# Patient Record
Sex: FEMALE | Race: WHITE | NOT HISPANIC OR LATINO | Employment: UNEMPLOYED | URBAN - METROPOLITAN AREA
[De-identification: names, ages, dates, MRNs, and addresses within clinical notes are randomized per-mention and may not be internally consistent; named-entity substitution may affect disease eponyms.]

---

## 2018-10-29 PROBLEM — R26.9 GAIT ABNORMALITY: Status: ACTIVE | Noted: 2018-10-29

## 2018-10-29 PROBLEM — R29.898 LEG WEAKNESS: Status: ACTIVE | Noted: 2018-10-29

## 2018-10-29 PROBLEM — Z74.09 IMPAIRED FUNCTIONAL MOBILITY AND ACTIVITY TOLERANCE: Status: ACTIVE | Noted: 2018-10-29

## 2018-10-29 PROBLEM — M25.561 RIGHT KNEE PAIN: Status: ACTIVE | Noted: 2018-10-29

## 2018-11-01 ENCOUNTER — TELEPHONE (OUTPATIENT)
Dept: ELECTROPHYSIOLOGY | Facility: CLINIC | Age: 58
End: 2018-11-01

## 2018-11-01 NOTE — TELEPHONE ENCOUNTER
Attempted to return call. Left message to return call  ----- Message from Maribell Ambrose MA sent at 11/1/2018  9:07 AM CDT -----  Contact: self  Pt. is calling to get Dr. Monique's e-mail address to send [facility] records to [facility] phone and also check outside records from . Please call.

## 2018-11-07 ENCOUNTER — OFFICE VISIT (OUTPATIENT)
Dept: ELECTROPHYSIOLOGY | Facility: CLINIC | Age: 58
End: 2018-11-07
Payer: COMMERCIAL

## 2018-11-07 ENCOUNTER — HOSPITAL ENCOUNTER (OUTPATIENT)
Dept: CARDIOLOGY | Facility: CLINIC | Age: 58
Discharge: HOME | End: 2018-11-07
Payer: COMMERCIAL

## 2018-11-07 VITALS
SYSTOLIC BLOOD PRESSURE: 112 MMHG | WEIGHT: 131.81 LBS | HEIGHT: 64 IN | DIASTOLIC BLOOD PRESSURE: 60 MMHG | BODY MASS INDEX: 22.5 KG/M2 | HEART RATE: 49 BPM

## 2018-11-07 DIAGNOSIS — R00.1 BRADYCARDIA: ICD-10-CM

## 2018-11-07 DIAGNOSIS — E03.9 ACQUIRED HYPOTHYROIDISM: Chronic | ICD-10-CM

## 2018-11-07 DIAGNOSIS — I48.0 PAROXYSMAL ATRIAL FIBRILLATION: Primary | ICD-10-CM

## 2018-11-07 DIAGNOSIS — Z85.3 PERSONAL HISTORY OF BREAST CANCER: ICD-10-CM

## 2018-11-07 PROCEDURE — 99204 OFFICE O/P NEW MOD 45 MIN: CPT | Mod: S$GLB,,, | Performed by: INTERNAL MEDICINE

## 2018-11-07 PROCEDURE — 3008F BODY MASS INDEX DOCD: CPT | Mod: CPTII,S$GLB,, | Performed by: INTERNAL MEDICINE

## 2018-11-07 PROCEDURE — 99999 PR PBB SHADOW E&M-EST. PATIENT-LVL III: CPT | Mod: PBBFAC,,, | Performed by: INTERNAL MEDICINE

## 2018-11-07 NOTE — PROGRESS NOTES
Subjective:    Patient ID:  Nerissa Felix is a 58 y.o. female who presents for evaluation of PAF(paroxysmal atrial fibrillation)      HPI 57 yo female with atrial fibrillation, hypothyroidism, Breast cancer, bradycardia.  Presents for second opinion.  Has been managed by Dr. Galvin.  They are friends with Arsh Cordero.    Has had paroxysmal symptomatic atrial fibrillation, managed with Flecainide as pill in pocket therapy.  First diagnosed in 2015.    Symptoms are palpitations without associated symptoms.  Has been occurring at least 1-2 times a month.  Lasting less than 24 hours.  Flecainide has been effective.  CHADSVASc is 1 (female sex).  Has intermittently been on Xarelto.    Echo 2/17 normal biventricular structure and function.  ECG reveals sinus bradycardia at 49 bpm.    Review of Systems   Constitution: Negative. Negative for weakness and malaise/fatigue.   Cardiovascular: Positive for palpitations. Negative for chest pain, dyspnea on exertion, irregular heartbeat, leg swelling, near-syncope, orthopnea, paroxysmal nocturnal dyspnea and syncope.   Respiratory: Positive for shortness of breath. Negative for cough.    Neurological: Negative for dizziness and light-headedness.   All other systems reviewed and are negative.       Objective:    Physical Exam   Constitutional: She is oriented to person, place, and time. She appears well-developed and well-nourished.   Eyes: Conjunctivae are normal. No scleral icterus.   Neck: No JVD present. No tracheal deviation present.   Cardiovascular: Normal rate and regular rhythm. PMI is not displaced.   Pulmonary/Chest: Effort normal and breath sounds normal. No respiratory distress.   Abdominal: Soft. There is no hepatosplenomegaly. There is no tenderness.   Musculoskeletal: She exhibits no edema or tenderness.   Neurological: She is alert and oriented to person, place, and time.   Skin: Skin is warm and dry. No rash noted.   Psychiatric: She has a normal mood and  affect. Her behavior is normal.         Assessment:       1. Paroxysmal atrial fibrillation    2. Acquired hypothyroidism    3. Personal history of breast cancer         Plan:           Paroxysmal symptomatic atrial fibrillation, with frequent events.  Agree with Dr. Galvin regarding PVI.  Discussed at length.

## 2019-11-14 ENCOUNTER — OFFICE VISIT (OUTPATIENT)
Dept: DERMATOLOGY | Facility: CLINIC | Age: 59
End: 2019-11-14
Payer: COMMERCIAL

## 2019-11-14 VITALS — RESPIRATION RATE: 18 BRPM | WEIGHT: 130.94 LBS | HEIGHT: 64 IN | BODY MASS INDEX: 22.35 KG/M2

## 2019-11-14 DIAGNOSIS — L65.9 ALOPECIA: ICD-10-CM

## 2019-11-14 DIAGNOSIS — L70.0 COMEDONE: ICD-10-CM

## 2019-11-14 DIAGNOSIS — L82.1 SEBORRHEIC KERATOSES: ICD-10-CM

## 2019-11-14 DIAGNOSIS — Z12.83 SCREENING EXAM FOR SKIN CANCER: ICD-10-CM

## 2019-11-14 DIAGNOSIS — L29.9 PRURITUS: ICD-10-CM

## 2019-11-14 DIAGNOSIS — L81.4 LENTIGINES: ICD-10-CM

## 2019-11-14 DIAGNOSIS — D22.9 MULTIPLE BENIGN NEVI: ICD-10-CM

## 2019-11-14 DIAGNOSIS — L82.0 INFLAMED SEBORRHEIC KERATOSIS: Primary | ICD-10-CM

## 2019-11-14 DIAGNOSIS — L57.0 AK (ACTINIC KERATOSIS): ICD-10-CM

## 2019-11-14 PROCEDURE — 99999 PR PBB SHADOW E&M-EST. PATIENT-LVL II: CPT | Mod: PBBFAC,,, | Performed by: DERMATOLOGY

## 2019-11-14 PROCEDURE — 17110 PR DESTRUCTION BENIGN LESIONS UP TO 14: ICD-10-PCS | Mod: S$GLB,,, | Performed by: DERMATOLOGY

## 2019-11-14 PROCEDURE — 17110 DESTRUCTION B9 LES UP TO 14: CPT | Mod: S$GLB,,, | Performed by: DERMATOLOGY

## 2019-11-14 PROCEDURE — 17000 DESTRUCT PREMALG LESION: CPT | Mod: 59,S$GLB,, | Performed by: DERMATOLOGY

## 2019-11-14 PROCEDURE — 99203 OFFICE O/P NEW LOW 30 MIN: CPT | Mod: 25,S$GLB,, | Performed by: DERMATOLOGY

## 2019-11-14 PROCEDURE — 99203 PR OFFICE/OUTPT VISIT, NEW, LEVL III, 30-44 MIN: ICD-10-PCS | Mod: 25,S$GLB,, | Performed by: DERMATOLOGY

## 2019-11-14 PROCEDURE — 3008F PR BODY MASS INDEX (BMI) DOCUMENTED: ICD-10-PCS | Mod: CPTII,S$GLB,, | Performed by: DERMATOLOGY

## 2019-11-14 PROCEDURE — 17000 PR DESTRUCTION(LASER SURGERY,CRYOSURGERY,CHEMOSURGERY),PREMALIGNANT LESIONS,FIRST LESION: ICD-10-PCS | Mod: 59,S$GLB,, | Performed by: DERMATOLOGY

## 2019-11-14 PROCEDURE — 17003 DESTRUCTION, PREMALIGNANT LESIONS; SECOND THROUGH 14 LESIONS: ICD-10-PCS | Mod: 59,S$GLB,, | Performed by: DERMATOLOGY

## 2019-11-14 PROCEDURE — 99999 PR PBB SHADOW E&M-EST. PATIENT-LVL II: ICD-10-PCS | Mod: PBBFAC,,, | Performed by: DERMATOLOGY

## 2019-11-14 PROCEDURE — 3008F BODY MASS INDEX DOCD: CPT | Mod: CPTII,S$GLB,, | Performed by: DERMATOLOGY

## 2019-11-14 PROCEDURE — 17003 DESTRUCT PREMALG LES 2-14: CPT | Mod: 59,S$GLB,, | Performed by: DERMATOLOGY

## 2019-11-14 RX ORDER — TRETINOIN 0.5 MG/G
CREAM TOPICAL NIGHTLY
COMMUNITY
End: 2022-10-03

## 2019-11-14 RX ORDER — TRIAMCINOLONE ACETONIDE 0.25 MG/G
CREAM TOPICAL
Qty: 30 G | Refills: 3 | Status: SHIPPED | OUTPATIENT
Start: 2019-11-14 | End: 2022-04-19

## 2019-11-14 NOTE — PROGRESS NOTES
Subjective:       Patient ID:  Nerissa Felix is a 59 y.o. female who presents for   Chief Complaint   Patient presents with    Lesion     Patient present for initial visit.     C/o lesion to back x year. Pt states lesion has gotten bigger feels scaly. Pt denies color, shape, or spontaneous bleeding. Not treating.    C/o dry spot on R temple 1 year. Not treating.    C/o dry spot to R hand x1 year. Not treating.     C/o large pore on nose. The patient denies any change, including change in color, increase in size, or spontaneous bleeding, associated with this lesion. Pt had treated it by another prescriber, not sure what treatment was called.    C/o lesion under right eye x year. The patient denies any change, including change in color, increase in size, or spontaneous bleeding, associated with this lesion.  Not treating.     C/o hair loss x months. Pt denies any traumas, stress, no surgeries, or change in medication. History of hypothyroidism   Lab Results       Component                Value               Date                       TSH                      <0.015 (L)          05/09/2016                C/o itchy foot for 10 years. Pt saw podiatry, said it was athletes foot. Was prescribed an antifungal, took it for a while but did not finish treatment.    Wants to talk about botox.    no Phx of NMSC.  no Fhx of melanoma.    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 Negative for recent sunburn and wears hat.   Hematologic/Lymphatic: Bruises/bleeds easily.        Objective:    Physical Exam   Constitutional: She appears well-developed and well-nourished. No distress.   Neurological: She is alert and oriented to person, place, and time. She is not disoriented.   Psychiatric: She has a normal mood and affect.   Skin:   Areas Examined (abnormalities noted in diagram):   Scalp / Hair Palpated and Inspected  Head / Face Inspection Performed  Neck Inspection Performed  Chest / Axilla Inspection Performed  Abdomen Inspection Performed  Genitals / Buttocks / Groin Inspection Performed  Back Inspection Performed  RUE Inspected  LUE Inspection Performed  RLE Inspected  LLE Inspection Performed  Nails and Digits Inspection Performed                               Diagram Legend     Erythematous scaling macule/papule c/w actinic keratosis       Vascular papule c/w angioma      Pigmented verrucoid papule/plaque c/w seborrheic keratosis      Yellow umbilicated papule c/w sebaceous hyperplasia      Irregularly shaped tan macule c/w lentigo     1-2 mm smooth white papules consistent with Milia      Movable subcutaneous cyst with punctum c/w epidermal inclusion cyst      Subcutaneous movable cyst c/w pilar cyst      Firm pink to brown papule c/w dermatofibroma      Pedunculated fleshy papule(s) c/w skin tag(s)      Evenly pigmented macule c/w junctional nevus     Mildly variegated pigmented, slightly irregular-bordered macule c/w mildly atypical nevus      Flesh colored to evenly pigmented papule c/w intradermal nevus       Pink pearly papule/plaque c/w basal cell carcinoma      Erythematous hyperkeratotic cursted plaque c/w SCC      Surgical scar with no sign of skin cancer recurrence      Open and closed comedones      Inflammatory papules and pustules      Verrucoid papule consistent consistent with wart     Erythematous eczematous patches and plaques      Dystrophic onycholytic nail with subungual debris c/w onychomycosis     Umbilicated papule    Erythematous-base heme-crusted tan verrucoid plaque consistent with inflamed seborrheic keratosis     Erythematous Silvery Scaling Plaque c/w Psoriasis     See annotation      Assessment / Plan:        Inflamed seborrheic keratosis  Procedure note for destruction via hyfrecation and curettage:    Verbal consent obtained. Risks of recurrence and scarring discussed.   1 lesions cleaned with alcohol and anesthetized with 1% lidocaine with epinephrine. Areas then lightly hyfrecated and curetted to remove gross lesion. Hemostasis achieved with aluminum chloride application. No complications.   Areas dressed with Vaseline jelly and bandage. Wound care discussed.    AK (actinic keratosis)  Premalignant nature discussed     Cryosurgery Procedure Note    Verbal consent from the patient is obtained including, but not limited to, risk of hypopigmentation/hyperpigmentation, scar, recurrence of lesion. The patient is aware of the precancerous quality and need for treatment of these lesions. Liquid nitrogen cryosurgery is applied to the 2 actinic keratoses, as detailed in the physical exam, to produce a freeze injury. The patient is aware that blisters may form and is instructed on wound care with gentle cleansing and use of vaseline ointment to keep moist until healed. The patient is supplied a handout on cryosurgery and is instructed to call if lesions do not completely resolve.      Seborrheic keratoses  These are benign inherited growths without a malignant potential. Reassurance given to patient. No treatment is necessary.       Multiple benign nevi  total body skin examination performed today including at least 12 points as noted in physical examination. No lesions suspicious for malignancy noted.  Reassurance provided.  Instructed patient to observe lesion(s) for changes and follow up in clinic if changes are noted. Discussed  BEE's of moles and brochure provided.      Lentigines  This is a benign hyperpigmented sun induced lesion. Daily sun protection will reduce the number of new lesions. Treatment of these benign lesions are considered cosmetic.  - can continue Hydroquinone and kojic acid cream x 3 months then one month break    Pruritus on right medial foot - could be nerve related  -     triamcinolone acetonide 0.025% (KENALOG) 0.025 % cream; AAA bid for pruritus  Dispense: 30 g; Refill: 3    Comedone  - Continue tretinoin 0.05% cream. Tolerating well with mild flaking    Alopecia  - History of hypothyroidism   - She is seeing her PCP tomorrow to check her thyroid. Also recommend CBC, vit D and iron studies to evaluate for systemic causes  -Recommend that the patient consider a trial of at least 6 months of minoxidil 5% topical solution/foam BID. We discussed that she may initially notice hair loss with initiation of this medication. We also recommended that she taper the medication should she choose to discontinue it.  -We recommended dietary supplementation with Biotin 5000 mcg per day and Vitamin D 800-1200 international units per day.    Screening exam for skin cancer  Total body skin examination performed today including at least 12 points as noted in physical examination. No lesions suspicious for malignancy noted.               Follow up in about 1 year (around 11/14/2020) for skin check.

## 2020-12-02 ENCOUNTER — OFFICE VISIT (OUTPATIENT)
Dept: DERMATOLOGY | Facility: CLINIC | Age: 60
End: 2020-12-02
Payer: COMMERCIAL

## 2020-12-02 VITALS — HEIGHT: 64 IN | WEIGHT: 130 LBS | BODY MASS INDEX: 22.2 KG/M2

## 2020-12-02 DIAGNOSIS — D22.9 MULTIPLE BENIGN NEVI: ICD-10-CM

## 2020-12-02 DIAGNOSIS — L82.0 INFLAMED SEBORRHEIC KERATOSIS: ICD-10-CM

## 2020-12-02 DIAGNOSIS — L70.0 OPEN COMEDONE: Primary | ICD-10-CM

## 2020-12-02 DIAGNOSIS — Z12.83 SCREENING EXAM FOR SKIN CANCER: ICD-10-CM

## 2020-12-02 DIAGNOSIS — L81.4 LENTIGINES: ICD-10-CM

## 2020-12-02 DIAGNOSIS — L82.1 SEBORRHEIC KERATOSES: ICD-10-CM

## 2020-12-02 PROCEDURE — 1126F PR PAIN SEVERITY QUANTIFIED, NO PAIN PRESENT: ICD-10-PCS | Mod: S$GLB,,, | Performed by: DERMATOLOGY

## 2020-12-02 PROCEDURE — 99214 PR OFFICE/OUTPT VISIT, EST, LEVL IV, 30-39 MIN: ICD-10-PCS | Mod: 25,S$GLB,, | Performed by: DERMATOLOGY

## 2020-12-02 PROCEDURE — 3008F BODY MASS INDEX DOCD: CPT | Mod: CPTII,S$GLB,, | Performed by: DERMATOLOGY

## 2020-12-02 PROCEDURE — 99999 PR PBB SHADOW E&M-EST. PATIENT-LVL III: CPT | Mod: PBBFAC,,, | Performed by: DERMATOLOGY

## 2020-12-02 PROCEDURE — 1126F AMNT PAIN NOTED NONE PRSNT: CPT | Mod: S$GLB,,, | Performed by: DERMATOLOGY

## 2020-12-02 PROCEDURE — 17110 PR DESTRUCTION BENIGN LESIONS UP TO 14: ICD-10-PCS | Mod: S$GLB,,, | Performed by: DERMATOLOGY

## 2020-12-02 PROCEDURE — 99999 PR PBB SHADOW E&M-EST. PATIENT-LVL III: ICD-10-PCS | Mod: PBBFAC,,, | Performed by: DERMATOLOGY

## 2020-12-02 PROCEDURE — 17110 DESTRUCTION B9 LES UP TO 14: CPT | Mod: S$GLB,,, | Performed by: DERMATOLOGY

## 2020-12-02 PROCEDURE — 3008F PR BODY MASS INDEX (BMI) DOCUMENTED: ICD-10-PCS | Mod: CPTII,S$GLB,, | Performed by: DERMATOLOGY

## 2020-12-02 PROCEDURE — 99214 OFFICE O/P EST MOD 30 MIN: CPT | Mod: 25,S$GLB,, | Performed by: DERMATOLOGY

## 2020-12-02 NOTE — PROGRESS NOTES
Subjective:       Patient ID:  Nerissa Felix is a 60 y.o. female who presents for   Chief Complaint   Patient presents with    Follow-up     LOV: 11/14/2019 with Dr Wells    60 yr old F present skin check. She has a bump on back of neck that was there at last visit which was removed via hyfrecation and curettage    no Phx of NMSC.  + personal history of breast cancer  no Fhx of melanoma.    Past Medical History:  No date: Anticoagulant long-term use      Comment:  81 mg asa  2007: Cancer      Comment:  Left breast  DCIS  No date: Chronic left shoulder pain  No date: H/O pleural effusion  [id number]: Hashimoto encephalopathy      Comment:  autoimmune disorder in remission  No date: Heart murmur  No date: Iliotibial band syndrome of right side  No date: Neck pain  No date: PAF (paroxysmal atrial fibrillation)  No date: Seizures      Comment:  1999  No date: Thyroid disease      Comment:  hypo  No date: TMJ (dislocation of temporomandibular joint)      Review of Systems   Skin: Positive for daily sunscreen use. Negative for recent sunburn and wears hat.   Hematologic/Lymphatic: Bruises/bleeds easily.        Objective:    Physical Exam   Constitutional: She appears well-developed and well-nourished. No distress.   Neurological: She is alert and oriented to person, place, and time. She is not disoriented.   Psychiatric: She has a normal mood and affect.   Skin:   Areas Examined (abnormalities noted in diagram):   Scalp / Hair Palpated and Inspected  Head / Face Inspection Performed  Neck Inspection Performed  Chest / Axilla Inspection Performed  Abdomen Inspection Performed  Genitals / Buttocks / Groin Inspection Performed  Back Inspection Performed  RUE Inspected  LUE Inspection Performed  RLE Inspected  LLE Inspection Performed  Nails and Digits Inspection Performed                           Diagram Legend     Erythematous scaling macule/papule c/w actinic keratosis       Vascular papule c/w angioma      Pigmented  verrucoid papule/plaque c/w seborrheic keratosis      Yellow umbilicated papule c/w sebaceous hyperplasia      Irregularly shaped tan macule c/w lentigo     1-2 mm smooth white papules consistent with Milia      Movable subcutaneous cyst with punctum c/w epidermal inclusion cyst      Subcutaneous movable cyst c/w pilar cyst      Firm pink to brown papule c/w dermatofibroma      Pedunculated fleshy papule(s) c/w skin tag(s)      Evenly pigmented macule c/w junctional nevus     Mildly variegated pigmented, slightly irregular-bordered macule c/w mildly atypical nevus      Flesh colored to evenly pigmented papule c/w intradermal nevus       Pink pearly papule/plaque c/w basal cell carcinoma      Erythematous hyperkeratotic cursted plaque c/w SCC      Surgical scar with no sign of skin cancer recurrence      Open and closed comedones      Inflammatory papules and pustules      Verrucoid papule consistent consistent with wart     Erythematous eczematous patches and plaques     Dystrophic onycholytic nail with subungual debris c/w onychomycosis     Umbilicated papule    Erythematous-base heme-crusted tan verrucoid plaque consistent with inflamed seborrheic keratosis     Erythematous Silvery Scaling Plaque c/w Psoriasis     See annotation      Assessment / Plan:        Open comedone  - After verbal consent was obtained, Area prepped with alcohol, anesthetized with approximately 1.0cc of 1% lidocaine with epinephrine.  A sterile 11 blade needle was used to puncture the surface. Comedo extractor was used to expel contents. Patient tolerated procedure without complications. Site(s) were dressed with vaseline and bandage(s) applied as necessary. Wound care instructions were provided.      Inflamed seborrheic keratosis  Procedure note for destruction via hyfrecation and curettage:     Verbal consent obtained. Risks of recurrence and scarring discussed.   1 lesions cleaned with alcohol and anesthetized with 1% lidocaine with  epinephrine. Areas then lightly hyfrecated and curetted to remove gross lesion. Hemostasis achieved with aluminum chloride application. No complications.   Areas dressed with Vaseline jelly and bandage. Wound care discussed.       Multiple benign nevi  Total body skin examination performed today including at least 12 points as noted in physical examination. No lesions suspicious for malignancy noted.      Seborrheic keratoses  These are benign inherited growths without a malignant potential. Reassurance given to patient. No treatment is necessary.       Lentigines  This is a benign hyperpigmented sun induced lesion. Daily sun protection will reduce the number of new lesions. Treatment of these benign lesions are considered cosmetic.      Screening exam for skin cancer  Total body skin examination performed today including at least 12 points as noted in physical examination. No lesions suspicious for malignancy noted.               Follow up in about 1 year (around 12/2/2021).

## 2021-04-13 ENCOUNTER — TELEPHONE (OUTPATIENT)
Dept: DERMATOLOGY | Facility: CLINIC | Age: 61
End: 2021-04-13

## 2022-01-18 ENCOUNTER — TELEPHONE (OUTPATIENT)
Dept: PLASTIC SURGERY | Facility: CLINIC | Age: 62
End: 2022-01-18
Payer: COMMERCIAL

## 2022-01-18 NOTE — TELEPHONE ENCOUNTER
Attempted to contact pt discuss upcoming appointment. Pt was not available at the time of the call.  Provider will be out due to a recent positive COVID test.  I left a detailed VM asking pt to call PLS office at her convenience to re schedule.

## 2022-03-11 ENCOUNTER — OFFICE VISIT (OUTPATIENT)
Dept: PLASTIC SURGERY | Facility: CLINIC | Age: 62
End: 2022-03-11
Payer: COMMERCIAL

## 2022-03-11 VITALS — SYSTOLIC BLOOD PRESSURE: 106 MMHG | HEART RATE: 69 BPM | DIASTOLIC BLOOD PRESSURE: 66 MMHG

## 2022-03-11 DIAGNOSIS — R22.0 MASS OF FACE: Primary | ICD-10-CM

## 2022-03-11 PROCEDURE — 99203 OFFICE O/P NEW LOW 30 MIN: CPT | Mod: S$GLB,,, | Performed by: SURGERY

## 2022-03-11 PROCEDURE — 1159F PR MEDICATION LIST DOCUMENTED IN MEDICAL RECORD: ICD-10-PCS | Mod: CPTII,S$GLB,, | Performed by: SURGERY

## 2022-03-11 PROCEDURE — 99999 PR PBB SHADOW E&M-EST. PATIENT-LVL III: ICD-10-PCS | Mod: PBBFAC,,, | Performed by: SURGERY

## 2022-03-11 PROCEDURE — 99203 PR OFFICE/OUTPT VISIT, NEW, LEVL III, 30-44 MIN: ICD-10-PCS | Mod: S$GLB,,, | Performed by: SURGERY

## 2022-03-11 PROCEDURE — 1160F RVW MEDS BY RX/DR IN RCRD: CPT | Mod: CPTII,S$GLB,, | Performed by: SURGERY

## 2022-03-11 PROCEDURE — 3074F SYST BP LT 130 MM HG: CPT | Mod: CPTII,S$GLB,, | Performed by: SURGERY

## 2022-03-11 PROCEDURE — 3078F PR MOST RECENT DIASTOLIC BLOOD PRESSURE < 80 MM HG: ICD-10-PCS | Mod: CPTII,S$GLB,, | Performed by: SURGERY

## 2022-03-11 PROCEDURE — 1159F MED LIST DOCD IN RCRD: CPT | Mod: CPTII,S$GLB,, | Performed by: SURGERY

## 2022-03-11 PROCEDURE — 3078F DIAST BP <80 MM HG: CPT | Mod: CPTII,S$GLB,, | Performed by: SURGERY

## 2022-03-11 PROCEDURE — 3074F PR MOST RECENT SYSTOLIC BLOOD PRESSURE < 130 MM HG: ICD-10-PCS | Mod: CPTII,S$GLB,, | Performed by: SURGERY

## 2022-03-11 PROCEDURE — 1160F PR REVIEW ALL MEDS BY PRESCRIBER/CLIN PHARMACIST DOCUMENTED: ICD-10-PCS | Mod: CPTII,S$GLB,, | Performed by: SURGERY

## 2022-03-11 PROCEDURE — 99999 PR PBB SHADOW E&M-EST. PATIENT-LVL III: CPT | Mod: PBBFAC,,, | Performed by: SURGERY

## 2022-03-11 NOTE — PROGRESS NOTES
Patient presents [facility] with a small cyst on the left lower cheek about 1 cm above the mandibular angle.  We will excise this in the minor surgery room in [place].

## 2022-03-15 ENCOUNTER — TELEPHONE (OUTPATIENT)
Dept: UROLOGY | Facility: CLINIC | Age: 62
End: 2022-03-15
Payer: COMMERCIAL

## 2022-03-15 NOTE — TELEPHONE ENCOUNTER
Spoke with Dotty at Dr Camargo office, will fax patient records from Dr Gillis when patient was seen by him @ [facility].

## 2022-03-15 NOTE — TELEPHONE ENCOUNTER
----- Message from Padmaja Gonzalez sent at 3/15/2022  4:30 PM CDT -----  Regarding: advice  Contact: stan  Type: Needs Medical Advice  Who Called:  stan with dr linda office  Symptoms (please be specific):  n/a  How long has patient had these symptoms:  n/a  Pharmacy name and phone #:  n/a  Best Call Back Number: [phone]    Additional Information: regarding wanda everett's provider saw

## 2022-03-18 ENCOUNTER — TELEPHONE (OUTPATIENT)
Dept: PLASTIC SURGERY | Facility: CLINIC | Age: 62
End: 2022-03-18
Payer: COMMERCIAL

## 2022-06-01 ENCOUNTER — APPOINTMENT (OUTPATIENT)
Dept: PLASTIC SURGERY | Facility: CLINIC | Age: 62
End: 2022-06-01
Payer: COMMERCIAL

## 2022-06-01 DIAGNOSIS — R22.0 MASS OF FACE: Primary | ICD-10-CM

## 2022-06-01 PROCEDURE — 88307 TISSUE EXAM BY PATHOLOGIST: CPT | Performed by: STUDENT IN AN ORGANIZED HEALTH CARE EDUCATION/TRAINING PROGRAM

## 2022-06-01 PROCEDURE — 88307 PR  SURG PATH,LEVEL V: ICD-10-PCS | Mod: 26,,, | Performed by: STUDENT IN AN ORGANIZED HEALTH CARE EDUCATION/TRAINING PROGRAM

## 2022-06-01 PROCEDURE — 88307 TISSUE EXAM BY PATHOLOGIST: CPT | Mod: 26,,, | Performed by: STUDENT IN AN ORGANIZED HEALTH CARE EDUCATION/TRAINING PROGRAM

## 2022-06-07 LAB
FINAL PATHOLOGIC DIAGNOSIS: NORMAL
Lab: NORMAL

## 2022-06-17 ENCOUNTER — OFFICE VISIT (OUTPATIENT)
Dept: PLASTIC SURGERY | Facility: CLINIC | Age: 62
End: 2022-06-17
Payer: COMMERCIAL

## 2022-06-17 VITALS
DIASTOLIC BLOOD PRESSURE: 74 MMHG | HEART RATE: 76 BPM | HEIGHT: 64 IN | BODY MASS INDEX: 22.36 KG/M2 | WEIGHT: 131 LBS | SYSTOLIC BLOOD PRESSURE: 122 MMHG

## 2022-06-17 DIAGNOSIS — Z09 SURGERY FOLLOW-UP EXAMINATION: Primary | ICD-10-CM

## 2022-06-17 PROCEDURE — 99024 PR POST-OP FOLLOW-UP VISIT: ICD-10-PCS | Mod: S$GLB,,, | Performed by: SURGERY

## 2022-06-17 PROCEDURE — 3074F PR MOST RECENT SYSTOLIC BLOOD PRESSURE < 130 MM HG: ICD-10-PCS | Mod: CPTII,S$GLB,, | Performed by: SURGERY

## 2022-06-17 PROCEDURE — 1160F RVW MEDS BY RX/DR IN RCRD: CPT | Mod: CPTII,S$GLB,, | Performed by: SURGERY

## 2022-06-17 PROCEDURE — 99999 PR PBB SHADOW E&M-EST. PATIENT-LVL III: CPT | Mod: PBBFAC,,, | Performed by: SURGERY

## 2022-06-17 PROCEDURE — 99999 PR PBB SHADOW E&M-EST. PATIENT-LVL III: ICD-10-PCS | Mod: PBBFAC,,, | Performed by: SURGERY

## 2022-06-17 PROCEDURE — 3008F PR BODY MASS INDEX (BMI) DOCUMENTED: ICD-10-PCS | Mod: CPTII,S$GLB,, | Performed by: SURGERY

## 2022-06-17 PROCEDURE — 3008F BODY MASS INDEX DOCD: CPT | Mod: CPTII,S$GLB,, | Performed by: SURGERY

## 2022-06-17 PROCEDURE — 3078F DIAST BP <80 MM HG: CPT | Mod: CPTII,S$GLB,, | Performed by: SURGERY

## 2022-06-17 PROCEDURE — 1159F PR MEDICATION LIST DOCUMENTED IN MEDICAL RECORD: ICD-10-PCS | Mod: CPTII,S$GLB,, | Performed by: SURGERY

## 2022-06-17 PROCEDURE — 99024 POSTOP FOLLOW-UP VISIT: CPT | Mod: S$GLB,,, | Performed by: SURGERY

## 2022-06-17 PROCEDURE — 3078F PR MOST RECENT DIASTOLIC BLOOD PRESSURE < 80 MM HG: ICD-10-PCS | Mod: CPTII,S$GLB,, | Performed by: SURGERY

## 2022-06-17 PROCEDURE — 1160F PR REVIEW ALL MEDS BY PRESCRIBER/CLIN PHARMACIST DOCUMENTED: ICD-10-PCS | Mod: CPTII,S$GLB,, | Performed by: SURGERY

## 2022-06-17 PROCEDURE — 3074F SYST BP LT 130 MM HG: CPT | Mod: CPTII,S$GLB,, | Performed by: SURGERY

## 2022-06-17 PROCEDURE — 1159F MED LIST DOCD IN RCRD: CPT | Mod: CPTII,S$GLB,, | Performed by: SURGERY

## 2022-06-17 NOTE — PROGRESS NOTES
Patient is status post excision of a cyst of the left lateral cheek.  She is doing well.  We did not use any superficial sutures only skin glue.  Everything is healed nicely she is discharged.

## 2022-07-21 ENCOUNTER — TELEPHONE (OUTPATIENT)
Dept: HEMATOLOGY/ONCOLOGY | Facility: CLINIC | Age: 62
End: 2022-07-21
Payer: COMMERCIAL

## 2022-07-21 NOTE — TELEPHONE ENCOUNTER
"Returned call to schedule, she did not answer. Left a brief message with my direct call back number.    ----- Message from Morgan Miller sent at 7/20/2022  2:17 PM CDT -----  Scheduling Request        Patient Status: Np      Scheduling Appt: Genetics      Time/Date Preference: Soonest available      Contact Preference?: [phone]       Treating Provider: Anushka      Do you feel you need to be seen today? No      Additional Notes: Stating she was referred by Dr. Reveles through an email sent to her daughter (MAYELA MORFIN [[id number]])      "Thank you for all that you do for our patients"       "

## 2022-08-12 ENCOUNTER — PATIENT MESSAGE (OUTPATIENT)
Dept: HEMATOLOGY/ONCOLOGY | Facility: CLINIC | Age: 62
End: 2022-08-12
Payer: COMMERCIAL

## 2022-09-23 ENCOUNTER — PATIENT MESSAGE (OUTPATIENT)
Dept: HEMATOLOGY/ONCOLOGY | Facility: CLINIC | Age: 62
End: 2022-09-23
Payer: COMMERCIAL

## 2022-09-29 ENCOUNTER — OFFICE VISIT (OUTPATIENT)
Dept: HEMATOLOGY/ONCOLOGY | Facility: CLINIC | Age: 62
End: 2022-09-29
Payer: COMMERCIAL

## 2022-09-29 ENCOUNTER — LAB VISIT (OUTPATIENT)
Dept: LAB | Facility: HOSPITAL | Age: 62
End: 2022-09-29
Payer: COMMERCIAL

## 2022-09-29 DIAGNOSIS — Z85.3 PERSONAL HISTORY OF BREAST CANCER: ICD-10-CM

## 2022-09-29 DIAGNOSIS — Z85.3 PERSONAL HISTORY OF BREAST CANCER: Primary | ICD-10-CM

## 2022-09-29 DIAGNOSIS — Z80.0 FAMILY HISTORY OF PANCREATIC CANCER: ICD-10-CM

## 2022-09-29 DIAGNOSIS — Z80.3 FAMILY HISTORY OF BREAST CANCER: ICD-10-CM

## 2022-09-29 DIAGNOSIS — Z71.83 ENCOUNTER FOR NONPROCREATIVE GENETIC COUNSELING: ICD-10-CM

## 2022-09-29 PROCEDURE — 1160F PR REVIEW ALL MEDS BY PRESCRIBER/CLIN PHARMACIST DOCUMENTED: ICD-10-PCS | Mod: CPTII,S$GLB,, | Performed by: PHYSICIAN ASSISTANT

## 2022-09-29 PROCEDURE — 1159F PR MEDICATION LIST DOCUMENTED IN MEDICAL RECORD: ICD-10-PCS | Mod: CPTII,S$GLB,, | Performed by: PHYSICIAN ASSISTANT

## 2022-09-29 PROCEDURE — 36415 COLL VENOUS BLD VENIPUNCTURE: CPT | Performed by: PHYSICIAN ASSISTANT

## 2022-09-29 PROCEDURE — 99205 PR OFFICE/OUTPT VISIT, NEW, LEVL V, 60-74 MIN: ICD-10-PCS | Mod: S$GLB,,, | Performed by: PHYSICIAN ASSISTANT

## 2022-09-29 PROCEDURE — 1160F RVW MEDS BY RX/DR IN RCRD: CPT | Mod: CPTII,S$GLB,, | Performed by: PHYSICIAN ASSISTANT

## 2022-09-29 PROCEDURE — 1159F MED LIST DOCD IN RCRD: CPT | Mod: CPTII,S$GLB,, | Performed by: PHYSICIAN ASSISTANT

## 2022-09-29 PROCEDURE — 99999 PR PBB SHADOW E&M-EST. PATIENT-LVL III: CPT | Mod: PBBFAC,,, | Performed by: PHYSICIAN ASSISTANT

## 2022-09-29 PROCEDURE — 99205 OFFICE O/P NEW HI 60 MIN: CPT | Mod: S$GLB,,, | Performed by: PHYSICIAN ASSISTANT

## 2022-09-29 PROCEDURE — 99999 PR PBB SHADOW E&M-EST. PATIENT-LVL III: ICD-10-PCS | Mod: PBBFAC,,, | Performed by: PHYSICIAN ASSISTANT

## 2022-09-29 NOTE — PROGRESS NOTES
"Department of Hematology and Oncology  [facility]    Cancer Genetics  Initial Consult    Date of Service:  22  Visit Provider:  Joanna Ferrera PA-C  Collaborating Physician:  Ophelia Fontenot MD    Patient:  Nerissa Felix  :  1960  MRN:  [id number]     Referring Provider    Self referral    SUBJECTIVE      Chief Complaint: Genetic evaluation  History of Present Illness (HPI):  Nerissa Felix ("Nerissa"), 62 y.o., assigned female sex at birth, is new to the [facility] Department of [facility]ology and On[facility] and to me. She has a history of breast DCIS diagnosed in  at age 46. She underwent genetic testing in  that was negative for mutations in BRCA1/2. I saw Nerissa's daughter, Rosario, for genetic counseling in 2022 and explained that Nerissa is the best candidate for testing as she had breast cancer and we recommend updated testing for anyone who had testing prior to [id number]. Advances have been made in genetic testing since  and we now test for 13 breast cancer genes and are able to detect BRCA1/2 mutations that were previously undetectable. Rosario and Nerissa agreed with the plan, so Nerissa presents today for genetic risk assessment and updated testing.     Genetic Assessment History  Germline cancer-genetic testing: Yes - Negative BRCA1/2 testing in .  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   Benign tumors: No  Colon polyps: No - Normal colonscopy in , 2016.  Pancreatitis: No  Chemoprevention: Never used  Uterus and ovaries intact: Yes  Ashkenazi Confucianism ancestry: No    Past Medical History:   Diagnosis Date    Chronic left shoulder pain     Ductal carcinoma in situ (DCIS) of left breast     H/O pleural effusion     Hashimoto encephalopathy 1999    autoimmune disorder in remission    Heart murmur     Hypothyroidism     Iliotibial " band syndrome of right side     Long-term use of aspirin therapy     81 mg asa    Neck pain     PAF (paroxysmal atrial fibrillation)     Seizures 1999    TMJ (dislocation of temporomandibular joint)      Patient Active Problem List    Diagnosis Date Noted    Other specified cardiac arrhythmias     Bradycardia 11/07/2018    Right knee pain 10/29/2018    Leg weakness 10/29/2018    Gait abnormality 10/29/2018    Impaired functional mobility and activity tolerance 10/29/2018    Paroxysmal atrial fibrillation 05/11/2016    Acute right lower quadrant pain 05/08/2016    Acute colitis 05/08/2016    Acquired hypothyroidism 05/08/2016    Lesion of soft tissue 12/28/2015    Personal history of breast cancer 2007      Family History  Germline cancer-genetic testing in blood relatives:  No  Consanguinity in ancestors:  No  No known history of cancer of colorectal polyps in extended family other than noted below.       Family History   Problem Relation Age of Onset    Breast cancer Mother 73    Arthritis Mother     Atrial fibrillation Father     Breast cancer Maternal Grandmother 70    Stroke Maternal Grandmother     Lung cancer Paternal Grandfather         +smoker    Pancreatic cancer Other     Breast cancer Other     Hypertension Neg Hx     Diabetes Neg Hx       Review of Systems  See HPI.    Pain 0/10  Recent falls: None   Patient's Distress Score today was 4/10 (with 10 being the worst). Anxiety regarding the visit. Referral to SW/psychology is not indicated.      OBJECTIVE      Physical Exam  Very pleasant patient.  Unaccompanied  Vitals signs:  There were no vitals filed for this visit.   Constitutional      Appearance:  Well developed and well nourished. No apparent distress.   Pulmonary     Effort:  Normal  Neurological     Mental Status:  Alert and oriented.     Coordination:  Normal  Psychiatric        Mood and Affect: Normal     Thought Content:  Normal       Speech:  Normal     Behavior:  Normal     Judgment:   Normal  Genetics-specific     It is my assessment that the patient is ready to proceed with cancer-genetic testing from a psychosocial perspective.    COUNSELING      Germline cancer-genetic testing is the testing of genes associated with cancer, known as cancer susceptibility genes.  Just as these genes are inherited from parents, mutations in these genes can be inherited, as well.  A mutation in a cancer susceptibility gene adversely affects the gene's ability to prevent cancer; therefore, carriers of cancer susceptibility gene mutations may be at increased risk for certain cancers.    A small percentage of cancers are caused by an cancer susceptibility gene mutation, meaning the cancer is genetic/hereditary; rather, most cancers are sporadic.  Causes of sporadic cancers may include environmental risk factors, lifestyle risk factors, and non-modifiable risk factors.  It is important to note that members of a family often share not only their genetics but also risk factors including environmental and lifestyle risk factors.    Results of genetic testing include positive, negative, and variant of unknown significance (VUS).  A positive result indicates the presence of at least one clinically significant mutation, and the patient's specific cancer risks vary depending upon the tumor-suppressor gene(s) in which there is/are a mutation(s).  With a positive result, in some cases, depending upon the specific result and the patient's clinical history, modified management may be recommended, including measures for risk reduction and/or surveillance; however, modified management is not always an option.  A negative result indicates that no clinically significant mutations were identified in the gene(s) tested.  A VUS indicates that there is not presently enough data for the laboratory to make a determination as to whether the variant is clinically significant; VUSs are not typically acted upon clinically.      The ability  to interpret the meaning of a negative genetic testing result in genes associated with cancer with which the patient has not personally been affected, when done prior to testing the appropriate affected relative(s), is significantly limited.  A negative result in the patient does not indicate that she cannot develop cancer, and, in fact, the patient may even be at increased risk for cancer based on shared risk factors with affected relatives.  The most informative candidates for initial genetic testing in a family are those who have been affected with cancer.    Modified management may also be recommended, even with a result of no or unknown significance, based upon risk assessment that incorporates the family history.      Sometimes, depending upon the genetic testing result and the cancer diagnosis, additional/modified treatments may be an option, though this is not guaranteed.    If Nerissa tests positive for a mutation, her first-degree relatives would each have a 50% chance of having the same mutation, and other, more distantly related blood-relatives would also be at risk of having the same mutation.       The Genetic Information Nondiscrimination Act (MARLO) is U.S. federal legislation that provides some protections against use of an individual's genetic information by their health insurer and by their employer.  Title I of MARLO prohibits most health insurers from utilizing an individual's genetic information to make decisions regarding insurance eligibility, coverage, underwriting, or premium charges.  Title II of MARLO prohibits covered entities, which include employers, employment agencies, labor organizations, and joint labor-management training and apprenticeship programs, from requesting, requiring, or disclosing the genetic information of employees and applicants.  MARLO also prohibits health insurers and employers from asking or mandating that an individual take a genetic test.  MARLO does not protect  individuals from genetic discrimination toward health insurance obtained through a job with the  or through the Federal Employees Health Benefits Plan; from genetic discrimination by employers with fewer than 15 employees; or from genetic discrimination by any other type of policies/entities, including but not limited to life insurance, disability insurance, long-term care insurance,  benefits, and  Health Services benefits.     An outside laboratory would perform the testing after a blood sample is collected here at the [facility] or a saliva sample is collected by the patient at home.  With genetic testing, there is a potential for the patient to incur out-of-pocket costs.  Results can take several weeks.  Post-test genetic counseling can be conducted once the genetic testing results are available.     Questions were encouraged and answered to the patient's satisfaction, and she verbalized understanding of information and agreement with the plan.       ASSESSMENT/PLAN      A cancer-genetic evaluation and pre-test genetic counseling were conducted. Neirssa has a personal history of unilateral DCIS at age 46 and family history of late-onset breast cancer in her mother and maternal grandmother. Based on the information provided by Nerissa, her personal and/or family history is mildly suggestive of a potential hereditary predisposition to cancer. The recommendation is to proceed with germline testing to include the genes commonly associated with hereditary breast cancer (SAE, BARD1, BRCA1, BRCA2, CDH1, CHEK2, NF1, PALB2, PTEN, RAD51C, RAD51D, STK11, TP53). Nerissa was given the option of proceeding with testing now, deferring testing to a later date, or declining testing and opted to proceed. She was also given the option of limited versus broad-panel testing and opted for limited testing to include only those genes associated with hereditary breast cancer.     Genetic test: [facility] Breast  Cancer Panel, 13+ genes (60840)  Specimen type: blood   Collection: By [facility] today.    Consent: The patient has provided her written informed consent.    Results are expected approximately 2-3 weeks after the genetic testing laboratory receives the specimen.      1. Encounter for nonprocreative genetic counseling    2. Personal history of breast cancer  - Genetic Misc Sendout Test, Blood; Future  - Proceed with germline genetic testing.  - Follow up with results.    3. Family history of breast cancer  - Genetic Misc Sendout Test, Blood; Future    Approximately 45 minutes were spent face-to-face with the patient.  Approximately 75 minutes in total were spent on this encounter, which includes face-to-face time and non-face-to-face time preparing to see the patient (e.g., review of tests), obtaining and/or reviewing separately obtained history, documenting clinical information in the electronic or other health record, independently interpreting results (not separately reported) and communicating results to the patient/family/caregiver, or care coordination (not separately reported).     REFERENCES     National Comprehensive Cancer Network (NCCN). (2021). Genetic/familial high-risk assessment: Breast, ovarian, and pancreatic. NCCN Clinical Practice Guidelines in Oncology (NCCN Guidelines), Version 1.2022.    Joanna Ferrera PA-C, MPAS, PA-C  Physician Assistant, Hereditary/High Risk Clinic  Hematology/Oncology, [facility]

## 2022-10-12 LAB
GENETIC COUNSELING?: YES
GENSO SPECIMEN TYPE: NORMAL
MISCELLANEOUS GENETIC TEST NAME: NORMAL
PARTENTAL OR SIBLING TESTING?: NO
REFERENCE LAB: NORMAL
TEST RESULT: NORMAL

## 2022-10-13 ENCOUNTER — PATIENT MESSAGE (OUTPATIENT)
Dept: HEMATOLOGY/ONCOLOGY | Facility: CLINIC | Age: 62
End: 2022-10-13
Payer: COMMERCIAL

## 2022-10-13 ENCOUNTER — DOCUMENTATION ONLY (OUTPATIENT)
Dept: HEMATOLOGY/ONCOLOGY | Facility: CLINIC | Age: 62
End: 2022-10-13
Payer: COMMERCIAL

## 2022-10-13 NOTE — LETTER
October 13, 2022    Nerissa Felix  [address] [place] [address]     Dear Nerissa,    Below are the results from your recent cancer genetic testing. Please review and let us know if you have any questions or concerns. If you would like to schedule an in-person or a virtual appointment with me to further discuss the results, please message me through your [facility] patient portal or call [phone] to request a post-test genetic counseling appointment.    A copy of the [email or web address] results report is available to you in your [facility] medical record and the [email or web address] patient portal. If you have any difficulty accessing your report, please let our office know so we can mail you a hard copy.    Germline Genetic Testing  The [facility] Breast Cancer Panel analyzes 13 genes (SAE, BARD1, BRCA1, BRCA2, CDH1, CHEK2, NF1, PALB2, PTEN, RAD51C, RAD51D, STK11, TP53) associated with hereditary breast cancer.     RESULTS:   Negative  The test did not identify any genetic variants known to cause cancer.         What do these results mean?    You tested negative for mutations in the genes currently associated with hereditary breast cancer.    Testing was comprehensive for the genes associated with these cancers so no additional testing is indicated.   This negative result significantly reduces, but does not completely eliminate, the possibility that you have a hereditary breast cancer. No further testing is indicated at this time, but may be indicated in the future if new genes that were not included on this test become associated with breast cancer or if there are advances in technology that allow for the detection of variants that are currently not detectable.   In regards to your family history of cancer, this result is considered an uninformative negative, as it is unknown if your relatives with cancer have/had a genetic variant you did not inherit. Your relatives who have/had cancer could consider seeing a genetics specialist for  evaluation and possible genetic testing. If any of these individuals are  or decline testing, it may be appropriate for their siblings and children to proceed with a genetic risk assessment.   Anyone interested in pursuing genetic counseling/testing can contact the [facility] for an in-personal or virtual appointment in [place] or [place] by calling [phone]. The [place] provider can see virtual patients in [place] or [place]. The [place] providers can only see virtual patients located in [place]. Virtual patients must be able to access the virtual visit through the MyChart ([facility]) portal. Anyone who lives in another state or prefers to see someone in-person closer to home can visit [email or web address] to locate a local genetic specialist in their area.  Cancer is usually caused by multiple risk factors, including lifestyle, environmental, medical and inherited risk factors. We sometimes see clusters of cancer in a family due to shared lifestyle, environmental, medical and inherited factors. Therefore, while your genetic testing was negative and did not reveal an inherited cancer gene mutation, you may still be at increased risk for the cancers in your family compared to someone who does not have a family history of these cancers.     Hereditary versus random/sporadic cancer:   Only a small percentage of cancers (5-10%) are hereditary, meaning they are caused by an inherited genetic variant (mutation). The remaining cancers (90-95%) are random or sporadic, caused often by a combination of risk factors including age, sex, race, physical characteristics and environmental and lifestyle factors (diet, obesity, smoking, lack of exercise, etc). Family members often share these risk factors resulting in multiple individuals with cancer. Even though your test was negative, you may still be at risk for certain cancers based on factors such as family history, genetic causes not  evaluated with this test, or other lifestyle and environmental influences.     Cancer screening and risk reduction  It is important that you and your relatives establish care with a primary care provider (PCP), whom you see at least annually for routine health maintenance and guidance on cancer screening and cancer risk reduction. Keep your PCP updated on your personal and family history.     What you can do to reduce your risk for cancer:   Avoid tobacco use; exercise; maintain a healthy weight; eat a diet rich in fruits, vegetables, and whole grains and low in saturated/trans fat, red meat, and processed meat; limit alcohol consumption; protect against sexually transmitted infections; protect against sun exposure, avoid tanning beds; and get regular cancer screenings as recommended by your healthcare team.    Recommendations:   Continue active breast cancer screening.   I calculated Rosario's lifetime risk for breast cancer using the Tyrer-Cuzick model and it is approximately 25%, which qualifies her to begin early breast cancer screening through our High Risk Breast clinic at the [facility]. I had previously spoken about this with her and will let her know that I am submitting the referral.     Follow-up    Please update me through [facility] [employer] with any changes to your personal or family history and with any relative's genetic testing results. I'm happy to review their results to determine how they might affect you and other family members. Genetics is an evolving field, so I invite you to contact me periodically to determine if any updated genetic testing is recommended for you or your relatives.     Sincerely,  ARLENE Argueta PA-C  Physician Assistant, Hereditary/High Risk Clinic  [facility]    Phone:  [phone]

## 2022-10-13 NOTE — PROGRESS NOTES
"Hereditary and High Risk Clinic  Department of Hematology and Oncology  [facility]    Cancer Genetics  Results Disclosure    Name: Nerissa Felix ("Nerissa")  MRN: [id number]    GERMLINE GENETIC TESTING       The [facility] Breast Cancer Panel analyzes 13 genes (SAE, BARD1, BRCA1, BRCA2, CDH1, CHEK2, NF1, PALB2, PTEN, RAD51C, RAD51D, STK11, TP53) associated with hereditary breast cancer.     RESULTS:   Negative  The test did not identify any genetic variants known to cause cancer.         Personal and Family History:   Nerissa has a personal history of:   DCIS of the left breast (, age 46)    Nerissa's family history is significant for:   Mother: Breast cancer 73  MGM: Breast cancer 70  MFCOR (MGF's niece): Breast cancer  MFCOR (MGF's nephew): Pancreatic cancer    *maternal first cousin once removed    Interpretation of Results:     Nerissa tested negative for pathogenic (harmful) mutations in the genes currently associated with hereditary breast cancer.   Testing was comprehensive for the genes associated with these cancers so no additional testing is indicated.   This negative result significantly reduces, but does not completely eliminate, the possibility that Nerissa had a hereditary breast cancer. No further testing is indicated at this time, but may be indicated in the future if new genes that were not included on this test become associated with breast cancer or if there are advances in technology that allow for the detection of variants that are currently not detectable.   In regards to her family history of cancer, this result is considered an uninformative negative, as it is unknown if her relatives with cancer have/had a genetic variant she did not inherit. Nerissa's affected relatives could consider seeing a genetics specialist for evaluation and possible genetic testing. If any of these individuals are  or decline testing, it may be appropriate for their siblings or children to proceed with a genetic " risk assessment.  Anyone interested in pursuing genetic counseling/testing can contact the [facility] for an in-personal or virtual appointment in [place] or [place] by calling [phone]. The [place] provider can see virtual patients in [place] or [place]. The [place] providers can only see virtual patients located in [place]. Virtual patients must be able to access the virtual visit through the MyChart ([facility]) portal. Anyone who lives in another state or prefers to see someone in-person closer to home can visit [email or web address] to locate a local genetic specialist in their area.  Cancer is usually caused by multiple risk factors, including lifestyle, environmental and inherited risk factors. Even though Nerissa's genetic testing was negative, she may still be at increased risk for the cancers in her family compared to someone who does not have a family history of these cancers.     Patient notification:   Phone call: The Genetics Navigator will notify the patient of the results and direct her to the results letter in [employer].   Results letter: A letter will be sent to the patient via [employer] that contains the test results and recommendations and advises the patient to notify me of any changes to her personal or family history and the genetic testing results of any relatives. Since genetics is an evolving field, she is also advised to check in with me periodically to see if updated testing is indicated.   Results report: The Genetics Navigator will ensure that Nerissa receives a copy of her [facility] results report and that a copy is available in [employer].     ARLENE Argueta, PABrittnyC  Hereditary/High Risk Clinic  Department of Hematology/Oncology  [facility]

## 2023-08-30 ENCOUNTER — OFFICE VISIT (OUTPATIENT)
Dept: OTOLARYNGOLOGY | Facility: CLINIC | Age: 63
End: 2023-08-30
Payer: COMMERCIAL

## 2023-08-30 VITALS — BODY MASS INDEX: 20 KG/M2 | HEIGHT: 67 IN | WEIGHT: 127.44 LBS

## 2023-08-30 DIAGNOSIS — J30.0 VASOMOTOR RHINITIS: ICD-10-CM

## 2023-08-30 DIAGNOSIS — J34.89 NASAL VESTIBULITIS: Primary | ICD-10-CM

## 2023-08-30 PROCEDURE — 1160F RVW MEDS BY RX/DR IN RCRD: CPT | Mod: CPTII,S$GLB,, | Performed by: OTOLARYNGOLOGY

## 2023-08-30 PROCEDURE — 3008F BODY MASS INDEX DOCD: CPT | Mod: CPTII,S$GLB,, | Performed by: OTOLARYNGOLOGY

## 2023-08-30 PROCEDURE — 99204 PR OFFICE/OUTPT VISIT, NEW, LEVL IV, 45-59 MIN: ICD-10-PCS | Mod: S$GLB,,, | Performed by: OTOLARYNGOLOGY

## 2023-08-30 PROCEDURE — 99999 PR PBB SHADOW E&M-EST. PATIENT-LVL III: CPT | Mod: PBBFAC,,, | Performed by: OTOLARYNGOLOGY

## 2023-08-30 PROCEDURE — 99204 OFFICE O/P NEW MOD 45 MIN: CPT | Mod: S$GLB,,, | Performed by: OTOLARYNGOLOGY

## 2023-08-30 PROCEDURE — 1160F PR REVIEW ALL MEDS BY PRESCRIBER/CLIN PHARMACIST DOCUMENTED: ICD-10-PCS | Mod: CPTII,S$GLB,, | Performed by: OTOLARYNGOLOGY

## 2023-08-30 PROCEDURE — 1159F MED LIST DOCD IN RCRD: CPT | Mod: CPTII,S$GLB,, | Performed by: OTOLARYNGOLOGY

## 2023-08-30 PROCEDURE — 3008F PR BODY MASS INDEX (BMI) DOCUMENTED: ICD-10-PCS | Mod: CPTII,S$GLB,, | Performed by: OTOLARYNGOLOGY

## 2023-08-30 PROCEDURE — 99999 PR PBB SHADOW E&M-EST. PATIENT-LVL III: ICD-10-PCS | Mod: PBBFAC,,, | Performed by: OTOLARYNGOLOGY

## 2023-08-30 PROCEDURE — 1159F PR MEDICATION LIST DOCUMENTED IN MEDICAL RECORD: ICD-10-PCS | Mod: CPTII,S$GLB,, | Performed by: OTOLARYNGOLOGY

## 2023-08-30 RX ORDER — IPRATROPIUM BROMIDE 42 UG/1
2 SPRAY, METERED NASAL 3 TIMES DAILY PRN
Qty: 15 ML | Refills: 11 | Status: SHIPPED | OUTPATIENT
Start: 2023-08-30 | End: 2023-12-04 | Stop reason: SDUPTHER

## 2023-08-30 RX ORDER — MUPIROCIN 20 MG/G
OINTMENT TOPICAL
Qty: 22 G | Refills: 1 | Status: SHIPPED | OUTPATIENT
Start: 2023-08-30 | End: 2023-12-11

## 2023-08-30 NOTE — PROGRESS NOTES
Subjective:       Patient ID: Nerissa Felix is a 62 y.o. female.    Chief Complaint: Sinus Problem    Nerissa is here for nasal crusting.   Length of symptoms: 1 month.  It has improved a little but still there. Present bilaterally. Mild itching and irritation.    She has fairly consistent rhinorrhea which is chronic.  She has fairly chronic mild nasal congestion but this doesn't limit her. She denies smell issues. She gets occasional sinus pressure.    She denies regular sinus or allergy issues through the years.      Pertinent meds: ASA  Pertinent medical issues: PAF, hypothyroidism,     Patient validated questionnaires (if applicable):  SNOT-22 score: : (P) 21  NOSE score:: (P) 30%  ETDQ-7 score:: (P) 1.7         No data to display                   No data to display                   No data to display                     Social History     Tobacco Use   Smoking Status Never   Smokeless Tobacco Never     Social History     Substance and Sexual Activity   Alcohol Use Yes    Comment: Occasional          Objective:        Constitutional:   She is oriented to person, place, and time. She appears well-developed and well-nourished. She appears alert. She is active. Normal speech.      Head:  Normocephalic and atraumatic. Head is without TMJ tenderness. No scars. Salivary glands normal.  Facial strength is normal.      Ears:    Right Ear: No drainage or swelling. No middle ear effusion.   Left Ear: No drainage or swelling.  No middle ear effusion.     Nose:  Mucosal edema and septal deviation (mild right high) present. No rhinorrhea or sinus tenderness. No turbinate hypertrophy.    Mild erythema and scaling of nasal vestibule bilaterally    Mouth/Throat  Oropharynx clear and moist without lesions or asymmetry, normal uvula midline and mirror exam normal. Normal dentition. No uvula swelling, lacerations or trismus. No oropharyngeal exudate. Tonsillar erythema, tonsillar exudate.      Neck:  Full range of motion with neck  supple and no adenopathy. Thyroid tenderness is present. No tracheal deviation, no edema, no erythema, normal range of motion, no stridor, no crepitus and no neck rigidity present. No thyroid mass present.     Cardiovascular:    Intact distal pulses and normal pulses.              Pulmonary/Chest:   Effort normal and breath sounds normal. No stridor.     Psychiatric:   Her speech is normal and behavior is normal. Her mood appears not anxious. Her affect is not labile.     Neurological:   She is alert and oriented to person, place, and time. No sensory deficit.     Skin:   No abrasions, lacerations, lesions, or rashes. No abrasion and no bruising noted.         Tests / Results:  none    Assessment:       1. Nasal vestibulitis    2. Vasomotor rhinitis          Plan:         Mupirocin x 10 d - notify if persistent    We discussed suspected vasomotor rhinitis. Atrovent prn.   Can consider RhinAer in the future pending response.

## 2023-09-29 DIAGNOSIS — J34.89 NASAL VESTIBULITIS: Primary | ICD-10-CM

## 2023-09-29 RX ORDER — SULFAMETHOXAZOLE AND TRIMETHOPRIM 800; 160 MG/1; MG/1
1 TABLET ORAL 2 TIMES DAILY
Qty: 20 TABLET | Refills: 0 | Status: SHIPPED | OUTPATIENT
Start: 2023-09-29 | End: 2023-10-09

## 2023-12-04 ENCOUNTER — OFFICE VISIT (OUTPATIENT)
Dept: OTOLARYNGOLOGY | Facility: CLINIC | Age: 63
End: 2023-12-04
Payer: COMMERCIAL

## 2023-12-04 VITALS — BODY MASS INDEX: 20.54 KG/M2 | WEIGHT: 131.19 LBS

## 2023-12-04 DIAGNOSIS — J30.0 VASOMOTOR RHINITIS: ICD-10-CM

## 2023-12-04 DIAGNOSIS — J34.89 NASAL VESTIBULITIS: Primary | ICD-10-CM

## 2023-12-04 PROCEDURE — 3008F BODY MASS INDEX DOCD: CPT | Mod: CPTII,S$GLB,, | Performed by: OTOLARYNGOLOGY

## 2023-12-04 PROCEDURE — 99214 PR OFFICE/OUTPT VISIT, EST, LEVL IV, 30-39 MIN: ICD-10-PCS | Mod: S$GLB,,, | Performed by: OTOLARYNGOLOGY

## 2023-12-04 PROCEDURE — 1160F RVW MEDS BY RX/DR IN RCRD: CPT | Mod: CPTII,S$GLB,, | Performed by: OTOLARYNGOLOGY

## 2023-12-04 PROCEDURE — 1160F PR REVIEW ALL MEDS BY PRESCRIBER/CLIN PHARMACIST DOCUMENTED: ICD-10-PCS | Mod: CPTII,S$GLB,, | Performed by: OTOLARYNGOLOGY

## 2023-12-04 PROCEDURE — 87186 SC STD MICRODIL/AGAR DIL: CPT | Performed by: OTOLARYNGOLOGY

## 2023-12-04 PROCEDURE — 99214 OFFICE O/P EST MOD 30 MIN: CPT | Mod: S$GLB,,, | Performed by: OTOLARYNGOLOGY

## 2023-12-04 PROCEDURE — 1159F PR MEDICATION LIST DOCUMENTED IN MEDICAL RECORD: ICD-10-PCS | Mod: CPTII,S$GLB,, | Performed by: OTOLARYNGOLOGY

## 2023-12-04 PROCEDURE — 87070 CULTURE OTHR SPECIMN AEROBIC: CPT | Performed by: OTOLARYNGOLOGY

## 2023-12-04 PROCEDURE — 99999 PR PBB SHADOW E&M-EST. PATIENT-LVL III: ICD-10-PCS | Mod: PBBFAC,,, | Performed by: OTOLARYNGOLOGY

## 2023-12-04 PROCEDURE — 87077 CULTURE AEROBIC IDENTIFY: CPT | Performed by: OTOLARYNGOLOGY

## 2023-12-04 PROCEDURE — 99999 PR PBB SHADOW E&M-EST. PATIENT-LVL III: CPT | Mod: PBBFAC,,, | Performed by: OTOLARYNGOLOGY

## 2023-12-04 PROCEDURE — 3008F PR BODY MASS INDEX (BMI) DOCUMENTED: ICD-10-PCS | Mod: CPTII,S$GLB,, | Performed by: OTOLARYNGOLOGY

## 2023-12-04 PROCEDURE — 1159F MED LIST DOCD IN RCRD: CPT | Mod: CPTII,S$GLB,, | Performed by: OTOLARYNGOLOGY

## 2023-12-04 RX ORDER — IPRATROPIUM BROMIDE 42 UG/1
2 SPRAY, METERED NASAL 3 TIMES DAILY PRN
Qty: 45 ML | Refills: 4 | Status: SHIPPED | OUTPATIENT
Start: 2023-12-04

## 2023-12-04 RX ORDER — MOMETASONE FUROATE 50 UG/1
SPRAY, METERED NASAL
COMMUNITY
End: 2024-03-04

## 2023-12-04 NOTE — PROGRESS NOTES
Subjective:       Patient ID: Nerissa Felix is a 63 y.o. female.    Chief Complaint: Follow-up (Pt thinks she has staph in nose still )    Nerissa is here for follow-up of nasal vestibulitis and vasomotor rhinitis.   I saw her 3 months ago and Rx Mupirocin and Atrovent.    She does get complete relief with Mupirocin cream but then seems to come back about a week after. Present today, though not as bad as before. Uses it for 2-3 weeks at a time.   Did take an oral antibiotic during the one of the courses.     She does find herself rubbing nasal rim with tissue because of drainage.   The Atrovent spray helps with rhinorrhea but maybe could use it more.     Patient validated questionnaires (if applicable):      %            No data to display                   No data to display                   No data to display                     Review of Systems   Constitutional: Negative for activity change and appetite change.   Respiratory: Negative for difficulty breathing and wheezing   Cardiovascular: Negative for chest pain.      Objective:        Constitutional:   Vital signs are normal. She appears well-developed and well-nourished.     Head:  Normocephalic and atraumatic.     Ears:  Hearing normal to normal and whispered voice; external ear normal without scars, lesions, or masses; ear canal, tympanic membrane, and middle ear normal..     Nose:  Nose normal including turbinates, nasal mucosa, sinuses and nasal septum.   Mild fissuring at inferior nasal rim L>R, mild erythema. Culture obtained    Mouth/Throat  Oropharynx clear and moist without lesions or asymmetry.     Neck:  Neck normal without thyromegaly masses, asymmetry, normal tracheal structure, crepitus, and tenderness.         Tests / Results:  none    Assessment:       1. Nasal vestibulitis    2. Vasomotor rhinitis          Plan:         Culture obtained - if pos. Will continue to treat Mupirocin x 10-14 d then 1-2 x weekly for maintenance  If neg, still could  be infec,; however, could maybe trial Kenalog cream  Decr nose blowing and rubbing as able.  Continue Atrovent  Discussed RhinAer as possible option

## 2023-12-07 LAB — BACTERIA SPEC AEROBE CULT: ABNORMAL

## 2023-12-08 ENCOUNTER — TELEPHONE (OUTPATIENT)
Dept: OTOLARYNGOLOGY | Facility: CLINIC | Age: 63
End: 2023-12-08
Payer: COMMERCIAL

## 2023-12-08 NOTE — TELEPHONE ENCOUNTER
----- Message from Dom Levy MD sent at 12/8/2023  7:12 AM CST -----  Pls go over results with pt.          Juana Multani,    Your culture confirms Staph in the nose which is causing the inflammation.  Let's hold off on trying a different (steroid) cream.  I'd do the Mupirocin for 10-14 days then a few times per week as maintenance.  As we discussed a while back, you are not any more contagious than another person. We all carry this bacteria on our bodies. Just normal hand hygiene.     I also forgot to give you the brochure on RhinAer, but you can look it up online -  [email or web address]    Dom Levy MD  Otolaryngology - Head and Neck Surgery  Office: [phone]  Cell: [phone]  Fax: [phone]    This message was generated using voice dictation. Please excuse any errors that may have been created by the transcription software.

## 2023-12-11 RX ORDER — MUPIROCIN 20 MG/G
OINTMENT TOPICAL
Qty: 22 G | Refills: 3 | Status: SHIPPED | OUTPATIENT
Start: 2023-12-11

## 2024-01-18 ENCOUNTER — TELEPHONE (OUTPATIENT)
Dept: NEUROLOGY | Facility: CLINIC | Age: 64
End: 2024-01-18
Payer: COMMERCIAL

## 2024-01-18 NOTE — TELEPHONE ENCOUNTER
Spoke with patient and clarified if Dr. Baxter orders MRI, it would not be done tomorrow and gave timeframe on length of appointment

## 2024-01-18 NOTE — TELEPHONE ENCOUNTER
----- Message from Frank Chappell sent at 1/18/2024  2:42 PM CST -----  Regarding: advice  Type:  Needs Medical Advice    Who Called: karlos    Best Call Back Number: [phone]      Additional Information: pt wants a call back from  [facility]. please call to discuss.

## 2024-01-19 ENCOUNTER — OFFICE VISIT (OUTPATIENT)
Dept: NEUROLOGY | Facility: CLINIC | Age: 64
End: 2024-01-19
Payer: COMMERCIAL

## 2024-01-19 VITALS
WEIGHT: 133.06 LBS | HEIGHT: 67 IN | SYSTOLIC BLOOD PRESSURE: 116 MMHG | DIASTOLIC BLOOD PRESSURE: 59 MMHG | BODY MASS INDEX: 20.88 KG/M2 | HEART RATE: 69 BPM

## 2024-01-19 DIAGNOSIS — M79.602 PAIN OF LEFT UPPER EXTREMITY: ICD-10-CM

## 2024-01-19 DIAGNOSIS — R29.2 HYPERREFLEXIA: ICD-10-CM

## 2024-01-19 DIAGNOSIS — M54.2 CERVICALGIA: Primary | ICD-10-CM

## 2024-01-19 PROCEDURE — 99999 PR PBB SHADOW E&M-EST. PATIENT-LVL IV: CPT | Mod: PBBFAC,,, | Performed by: PSYCHIATRY & NEUROLOGY

## 2024-01-19 PROCEDURE — 99204 OFFICE O/P NEW MOD 45 MIN: CPT | Mod: S$GLB,,, | Performed by: PSYCHIATRY & NEUROLOGY

## 2024-01-19 PROCEDURE — 1160F RVW MEDS BY RX/DR IN RCRD: CPT | Mod: CPTII,S$GLB,, | Performed by: PSYCHIATRY & NEUROLOGY

## 2024-01-19 PROCEDURE — 3008F BODY MASS INDEX DOCD: CPT | Mod: CPTII,S$GLB,, | Performed by: PSYCHIATRY & NEUROLOGY

## 2024-01-19 PROCEDURE — 3074F SYST BP LT 130 MM HG: CPT | Mod: CPTII,S$GLB,, | Performed by: PSYCHIATRY & NEUROLOGY

## 2024-01-19 PROCEDURE — 1159F MED LIST DOCD IN RCRD: CPT | Mod: CPTII,S$GLB,, | Performed by: PSYCHIATRY & NEUROLOGY

## 2024-01-19 PROCEDURE — 3078F DIAST BP <80 MM HG: CPT | Mod: CPTII,S$GLB,, | Performed by: PSYCHIATRY & NEUROLOGY

## 2024-01-19 RX ORDER — LEVOTHYROXINE SODIUM 100 UG/1
100 TABLET ORAL
COMMUNITY
Start: 2024-01-02

## 2024-01-19 NOTE — PROGRESS NOTES
Date: 1/19/2024    Patient ID: Nerissa Felix is a 63 y.o. female.    Referring Provider:  Self, Aaareferral    Chief Complaint: Pain      History of Present Illness:  Ms. Felix is a 63 y.o. female who presents for neck and posterior head pain.     She feels a sensation start at the base of her neck and moves up to the back of her head and even into her shoulders a bit on both sides. This is a burning sensation. It is a tightness feeling. It lasts about 1 minute and subsides. Then has been occurring for a couple of years but it is worsening and becoming more frequent. She has been keeping track of it it is happening every few days on average but sometimes longer between or one time multiple times per day.     She notes some left arm and neck soreness and tenderness. Her mother had a stroke and she has been sleeping at the hospital or rehab while she is recovering. However, the worsening pain started before sleeping at the hospital/rehab rooms.     She works out with a .     She notes some neck issues in the past. She has had neck imaging at [facility] before and told it was abnormal. She notes being told she has scoliosis.     No bowel or bladder issues. No ataxia or gait dysfunction.     Allergies:  Review of patient's allergies indicates:   Allergen Reactions    Piroxicam Rash and Other (See Comments)       Current Medications:  Current Outpatient Medications   Medication Sig Dispense Refill    aspirin (ECOTRIN) 81 MG EC tablet Take 81 mg by mouth once daily.      BIOTIN ORAL Take by mouth.      calcium-vitamin D3 500 mg(1,250mg) -200 unit per tablet Take 1 tablet by mouth 2 (two) times daily with meals.      DIETARY SUPPLEMENT ORAL Take 1 tablet by mouth once daily.      estradioL (VAGIFEM) 10 mcg Tab Imvexxy Maintenance Pack 10 mcg vaginal insert   Insert 1 vaginal insert twice a week by vaginal route.      ipratropium (ATROVENT) 42 mcg (0.06 %) nasal spray 2 sprays by Each Nostril route 3 (three) times  daily as needed for Rhinitis. 45 mL 4    MULTIVITAMIN ORAL Take by mouth.      mupirocin (BACTROBAN) 2 % ointment Apply pea sized amount to inside of nostrils twice daily for 10 days 22 g 3    SYNTHROID 100 mcg tablet Take 100 mcg by mouth before breakfast.      zinc sulfate (ZINC-15) 66 mg Tab zinc      calcium carbonate (CALCIUM 500 ORAL) Calcium 500      DIETARY SUPPLEMENT ORAL Take by mouth once daily.      levothyroxine (SYNTHROID) 112 MCG tablet Take 100 mcg by mouth before breakfast.      mometasone (NASONEX) 50 mcg/actuation nasal spray Spray 2 sprays every day by intranasal route.       No current facility-administered medications for this visit.       Past Medical History:  Past Medical History:   Diagnosis Date    Chronic left shoulder pain     Ductal carcinoma in situ (DCIS) of left breast 2007    Genetic testing 10/2022    negative, [facility] Breast Cancer Panel, DNA only, 13-genes    H/O pleural effusion     Hashimoto encephalopathy 1999    autoimmune disorder in remission    Heart murmur     Hypothyroidism     Iliotibial band syndrome of right side     Long-term use of aspirin therapy     81 mg asa    Neck pain     PAF (paroxysmal atrial fibrillation)     Seizures 1999    TMJ (dislocation of temporomandibular joint)        Past Surgical History:  Past Surgical History:   Procedure Laterality Date    ABDOMINAL HERNIA REPAIR  2008    ABLATION OF ARRHYTHMOGENIC FOCUS FOR ATRIAL FIBRILLATION N/A 11/26/2018    Procedure: Ablation atrial fibrillation;  Surgeon: Jesus Galvin III, MD;  Location: [facility];  Service: Cardiology;  Laterality: N/A;    BILATERAL MASTECTOMY Bilateral 2007    BREAST RECONSTRUCTION Bilateral 2007    with ÓSCAR flaps with bilat. hip tissue    KNEE ARTHROSCOPY Right 2017    MASS EXCISION Right 12/28/2015    R hip, encapsulated fat necrosis with fibrosis       Family History:  family history includes Arthritis in her mother; Atrial fibrillation in her father; Breast cancer in an other  "family member; Breast cancer (age of onset: 70) in her maternal grandmother; Breast cancer (age of onset: 73) in her mother; Lung cancer in her paternal grandfather; Pancreatic cancer in an other family member; Stroke in her maternal grandmother.    Social History:   reports that she has never smoked. She has never used smokeless tobacco. She reports current alcohol use. She reports that she does not use drugs.    Physical Exam:  Vitals:    01/19/24 0802   BP: (!) 116/59   Pulse: 69   Weight: 60.3 kg (133 lb 0.8 oz)   Height: 5' 7" (1.702 m)   PainSc: 0-No pain     Body mass index is 20.84 kg/m².    Neurological Exam:  Mental status: Awake, alert.  Speech/Language: No dysarthria or aphasia on conversation.   Cranial nerves: Pupils equal round and reactive to light, extraocular movements intact, facial strength and sensation intact bilaterally, tongue midline, hearing grossly intact bilaterally. Shoulder shrug normal bilaterally.   Motor: 5 out of 5 strength throughout the upper and lower extremities bilaterally. Normal bulk and tone.   Sensation: Intact to light touch, pinprick, and vibration bilaterally.  DTR: 2+ at the biceps bilaterally and right knee. 2++ left knee.  Coordination: Finger-nose-finger testing and rapid alternating movements normal bilaterally. No tremor.   Gait: Normal tandem gait    Data:  I have personally reviewed the referring provider's notes, labs, & imaging made available to me today.     Labs:  CBC:   Lab Results   Component Value Date    WBC 6.67 11/26/2018    HGB 13.0 11/26/2018    HCT 38.1 11/26/2018     11/26/2018    MCV 97 11/26/2018    RDW 11.8 11/26/2018     BMP:   Lab Results   Component Value Date     11/26/2018    K 3.8 11/26/2018     11/26/2018    CO2 25 11/26/2018    BUN 20 (H) 11/26/2018    CREATININE 0.50 11/26/2018    GLU 89 11/26/2018    CALCIUM 9.2 11/26/2018     LFTS;   Lab Results   Component Value Date    PROT 7.2 05/08/2016    ALBUMIN 4.1 05/08/2016 " "   BILITOT 0.7 05/08/2016    AST 30 05/08/2016    ALKPHOS 49 05/08/2016    ALT 28 05/08/2016     COAGS:   Lab Results   Component Value Date    INR 1.3 05/09/2016     FLP: No results found for: "CHOL", "HDL", "LDLCALC", "TRIG", "CHOLHDL"      Assessment and Plan:  Ms. Felix is a 63 y.o. femalehere for neck and posterior head pain. Description sounds to likely be stemming from the cervical spine. This has been increasing in frequency. She has prior abnormal neck imaging. Will request those records. Given the arm pain and left knee hyperreflexia, will obtain MRI cervical spine to evaluate. May do EMG of the left arm if arm symptoms persist. Will likely refer to PT pending imaging results vs pain management.     Cervicalgia  -     MRI Cervical Spine Without Contrast; Future; Expected date: 01/19/2024    Pain of left upper extremity  -     MRI Cervical Spine Without Contrast; Future; Expected date: 01/19/2024    Hyperreflexia  -     MRI Cervical Spine Without Contrast; Future; Expected date: 01/19/2024       I spent a total of 45 minutes on the day of the visit.This includes face to face time and non-face to face time preparing to see the patient (eg, review of tests), Obtaining and/or reviewing separately obtained history, Documenting clinical information in the electronic or other health record, Independently interpreting results and communicating results to the patient/family/caregiver, or Care coordination.     "

## 2024-01-22 ENCOUNTER — PATIENT MESSAGE (OUTPATIENT)
Dept: NEUROLOGY | Facility: CLINIC | Age: 64
End: 2024-01-22
Payer: COMMERCIAL

## 2024-01-31 ENCOUNTER — TELEPHONE (OUTPATIENT)
Dept: NEUROLOGY | Facility: CLINIC | Age: 64
End: 2024-01-31
Payer: COMMERCIAL

## 2024-02-01 ENCOUNTER — HOSPITAL ENCOUNTER (OUTPATIENT)
Dept: RADIOLOGY | Facility: HOSPITAL | Age: 64
Discharge: HOME | End: 2024-02-01
Attending: PSYCHIATRY & NEUROLOGY
Payer: COMMERCIAL

## 2024-02-01 ENCOUNTER — TELEPHONE (OUTPATIENT)
Dept: NEUROLOGY | Facility: CLINIC | Age: 64
End: 2024-02-01
Payer: COMMERCIAL

## 2024-02-01 DIAGNOSIS — R29.2 HYPERREFLEXIA: ICD-10-CM

## 2024-02-01 DIAGNOSIS — M54.2 CERVICALGIA: ICD-10-CM

## 2024-02-01 DIAGNOSIS — M79.602 PAIN OF LEFT UPPER EXTREMITY: ICD-10-CM

## 2024-02-01 PROCEDURE — 72141 MRI NECK SPINE W/O DYE: CPT | Mod: 26,,, | Performed by: RADIOLOGY

## 2024-02-01 PROCEDURE — 72141 MRI NECK SPINE W/O DYE: CPT | Mod: TC,PO

## 2024-02-01 NOTE — TELEPHONE ENCOUNTER
DIS imaging disc received for pt. Disc given to Dr. Baxter to review and will be sent to radiology for upload.

## 2024-02-02 ENCOUNTER — TELEPHONE (OUTPATIENT)
Dept: NEUROLOGY | Facility: CLINIC | Age: 64
End: 2024-02-02
Payer: COMMERCIAL

## 2024-02-02 DIAGNOSIS — M48.02 CERVICAL SPINAL STENOSIS: Primary | ICD-10-CM

## 2024-02-02 DIAGNOSIS — M50.30 DDD (DEGENERATIVE DISC DISEASE), CERVICAL: ICD-10-CM

## 2024-02-02 NOTE — TELEPHONE ENCOUNTER
"LVM for pt to return call in regards to MRI results.        Per Dr. Baxter, "The MRI cervical spine shows some degenerative changes that result in mild spinal stenosis and some narrowing of the areas where the nerves exit the spine. This can lead to pain and pinching of the nerves. I looked at the scan from 2017 and the changes are similar. Will refer to physical therapy and pain management to discuss treatment options if the pain persists "  "

## 2024-02-02 NOTE — TELEPHONE ENCOUNTER
----- Message from Mira Baxter MD sent at 2/2/2024  1:17 PM CST -----  The MRI cervical spine shows some degenerative changes that result in mild spinal stenosis and some narrowing of the areas where the nerves exit the spine. This can lead to pain and pinching of the nerves. I looked at the scan from 2017 and the changes are similar. Will refer to physical therapy and pain management to discuss treatment options if the pain persists.

## 2024-02-02 NOTE — TELEPHONE ENCOUNTER
There were 2 copies of D.I.S. disc received for pt. One sent to radiology for upload. The other placed in mail to pt.

## 2024-02-20 ENCOUNTER — CLINICAL SUPPORT (OUTPATIENT)
Dept: REHABILITATION | Facility: HOSPITAL | Age: 64
End: 2024-02-20
Attending: PSYCHIATRY & NEUROLOGY
Payer: COMMERCIAL

## 2024-02-20 DIAGNOSIS — M48.02 CERVICAL SPINAL STENOSIS: ICD-10-CM

## 2024-02-20 DIAGNOSIS — M50.30 DDD (DEGENERATIVE DISC DISEASE), CERVICAL: ICD-10-CM

## 2024-02-20 PROCEDURE — 97110 THERAPEUTIC EXERCISES: CPT | Mod: PO

## 2024-02-20 PROCEDURE — 97161 PT EVAL LOW COMPLEX 20 MIN: CPT | Mod: PO

## 2024-02-20 NOTE — PLAN OF CARE
[facility]  Physical Therapy Initial Evaluation    Name: Nerissa Felix  Clinic Number: [id number]    Therapy Diagnosis:   Encounter Diagnoses   Name Primary?    Cervical spinal stenosis     DDD (degenerative disc disease), cervical      Physician: Mira Baxter MD    Physician Orders: PT Eval and Treat   Medical Diagnosis from Referral:   M48.02 (ICD-10-CM) - Cervical spinal stenosis   M50.30 (ICD-10-CM) - DDD (degenerative disc disease), cervical     Evaluation Date: 2/20/2024  Authorization Period Expiration: 2/1/2025  Plan of Care Expiration: 2/20/2024  Visit # / Visits authorized: 1/ 1  FOTO: 1/3    Time In: 10:50 am  Time Out: 11:45 am  Total Billable Time: 55 minutes    Precautions: Standard    Subjective   Date of onset: December 2023  History of current condition - Nerissa reports: Has a sensation that starts around bilateral upper trapezius muscles and radiates into neck and posterior head. Fall of 2023 the episodes started happening more often(2-3/week). The episodes stopping happening around 1/16/2024. States these episodes are more frightening vs painful as they usually only last a few seconds. History of Atrial Fibrillation but had an ablation in 2018 which has resolved symptoms. Denies any shortness of breath. History of headaches and migraines but has not followed up with neurologist about the headaches. Works out with a  2x/week with resistance and some cardiovascular training. Not currently limited with activities. Denies any dropping of objects with hand. No bowel or bladder complaints.     Medical History:   Past Medical History:   Diagnosis Date    Chronic left shoulder pain     Ductal carcinoma in situ (DCIS) of left breast 2007    Genetic testing 10/2022    negative, [facility] Breast Cancer Panel, DNA only, 13-genes    H/O pleural effusion     Hashimoto encephalopathy 1999    autoimmune disorder in remission    Heart murmur     Hypothyroidism     Iliotibial band  syndrome of right side     Long-term use of aspirin therapy     81 mg asa    Neck pain     PAF (paroxysmal atrial fibrillation)     Seizures 1999    TMJ (dislocation of temporomandibular joint)        Surgical History:   Nerissa Felix  has a past surgical history that includes Bilateral mastectomy (Bilateral, 2007); Breast reconstruction (Bilateral, 2007); Abdominal hernia repair (2008); Mass excision (Right, 12/28/2015); Knee arthroscopy (Right, 2017); and Ablation of arrhythmogenic focus for atrial fibrillation (N/A, 11/26/2018).    Medications:   Nerissa has a current medication list which includes the following prescription(s): aspirin, biotin, calcium carbonate, calcium-vitamin d3, dietary supplement, dietary supplement, estradiol, ipratropium, levothyroxine, mometasone, multivitamin, mupirocin, synthroid, and zinc-15.    Allergies:   Review of patient's allergies indicates:   Allergen Reactions    Piroxicam Rash and Other (See Comments)        Imaging, MRI studies: 1/19/2024  Impression:     1. Straightening of normal lordosis.  2. Multilevel degenerative changes contributing between mild-to-moderate spinal canal stenosis at C4-5.  Findings also contribute to neural foraminal narrowing moderate on the left at C3-4, moderate bilaterally at C4-5 and moderate on the right at C6-7.    Prior Therapy: yes at [facility] several years ago   Social History: lives with their spouse  Occupation: not working  Prior Level of Function: independent, works out with  2x/week with resistance weights and cardiovascular training  Current Level of Function: independent with intermittent burning sensation to neck and UE, migraines    Pain:  Current 0/10, worst 5/10, best 0/10   Location: bilateral neck and radiates to posterior head  Description: Burning  Aggravating Factors: intermittent symptoms randomly  Easing Factors: excedrine with relief    Pts goals: understand what is causing the pain    Objective       Observation:  pleasant and cooperative demeanor.     Posture Alignment: forward head;    Sensation: Light touch: Intact    GAIT DEVIATIONS: Nerissa displays no significant gait deviation.    Cervical Range of Motion:    Degrees Pain   Flexion WFL Stretching to posterior neck     Extension WFL Sensation that radiates into posterior head/headache     Right Side Bending 50% Stretch to L     Left Side Bending 50% N     Right Rotation WFL N   Left Rotation WFL N      Shoulder Range of Motion:   Shoulder Left Right   Flexion 180 180   Abduction 180 180   ER 85 85   IR 70 70     Strength:  Cervical MMT   Flexion 4/5   Extension 4/5   Right Side Bend 4/5   Left Side Bend 4/5     Upper Extremity Strength  (R) UE  (L) UE    Shoulder elevation: 5/5 Shoulder elevation: 5/5   Shoulder flexion: 5/5 Shoulder flexion: 5/5   Shoulder Abduction: 5/5 Shoulder abduction: 5/5   Elbow flexion: 5/5 Elbow flexion: 5/5   Elbow extension: 5/5 Elbow extension: 5/5   Lower Trap 4-/5 Lower Trap 4-/5   Middle Trap 4/5 Middle Trap 4/5   Rhomboids 4+/5 Rhomboids 4+/5       Special Tests:  Distraction -   Compression -   Spurlings -   Sharp-V[place]a(forehead and C2, post pressure on forehead -       Upper Limb Neurodynamic testing:   Right Left   UNT - -   MNT + +   RNT - +       Joint Mobility: hypomobility of C4-6    Palpation: tenderness and tightness of upper trapezius on L>R      Flexibility: tightness of upper trapezius    PT Evaluation Completed? Yes  Discussed Plan of Care with patient: Yes    CMS Impairment/Limitation/Restriction for FOTO NDI Survey    Therapist reviewed FOTO scores for Nerissa Felix on 2/20/2024.   FOTO documents entered into [employer] ([employer]) - see Media section.    Limitation Score: 6  Category: Other           TREATMENT   Treatment Time In: 11:35 am  Treatment Time Out: 11:45 am  Total Treatment time separate from Evaluation: 10 minutes    Nerissa received therapeutic exercises to develop strength, ROM, and flexibility for 10 minutes including:    UT  stretch  LS stretch  Supine chin tucks  Seated chin isometrics  SNAG rotation and extension    Home Exercises and Patient Education Provided    Education provided:   - HEP, plan of care, follow up with neurologist for migraines    Written Home Exercises Provided: yes.  Exercises were reviewed and Nerissa was able to demonstrate them prior to the end of the session.  Nerissa demonstrated good  understanding of the education provided.     See EMR under Patient Instructions for exercises provided 2/20/2024.    Assessment   Nerissa is a 63 y.o. female referred to outpatient Physical Therapy with a medical diagnosis of cervicalgia and degenerative disc disease of cervical spine. Pt presents with cervical discomfort and burning sensation that occurs starting in upper trapezius muscles and radiates up into posterior head, occasionally radiates into LUE that was occurring from December until mid January. Currently not having any discomfort. No reproduce able symptoms noted with testing of cervical spine. Tenderness present to left upper trapezius with some tension noted. Upper limb tension positive testing of median nerve bilaterally and radial nerve of L. Discussed symptoms possibly due to upper trapezius tension and magnified with stress over the holidays. Instructed to reach out to neurologist if migraine/headaches return for assessment/treatment as that could be contributing to symptoms. Pt is not appropriate for physical therapy at this time. Given stretches and strengthening activities to perform independently at home.       Plan of care discussed with patient: Yes  Pt's spiritual, cultural and educational needs considered and patient is agreeable to the plan of care and goals as stated below:     Anticipated Barriers for therapy: none    Medical Necessity is demonstrated by the following  History  Co-morbidities and personal factors that may impact the plan of care Co-morbidities:   See medical history above.    Personal  Factors:   lifestyle     low   Examination  Body Structures and Functions, activity limitations and participation restrictions that may impact the plan of care Body Regions:   neck  upper extremities  trunk    Body Systems:    gross symmetry  ROM  strength    Participation Restrictions:   none    Activity limitations:   Learning and applying knowledge  no deficits    General Tasks and Commands  no deficits    Communication  no deficits    Mobility  Long distance driving(5-6 hours)    Self care  no deficits    Domestic Life  no deficits    Interactions/Relationships  no deficits    Life Areas  no deficits    Community and Social Life  no deficits         Complexity: low   Clinical Presentation evolving clinical presentation with changing clinical characteristics mod   Decision Making/ Complexity Score: low       Plan   Pt given HEP to perform independently. She is not appropriate for physical therapy at this time.       Candi Farris, PT

## 2024-03-06 ENCOUNTER — TELEPHONE (OUTPATIENT)
Dept: NEUROLOGY | Facility: CLINIC | Age: 64
End: 2024-03-06
Payer: COMMERCIAL

## 2024-04-22 ENCOUNTER — OFFICE VISIT (OUTPATIENT)
Dept: UROLOGY | Facility: CLINIC | Age: 64
End: 2024-04-22
Payer: COMMERCIAL

## 2024-04-22 VITALS — WEIGHT: 130.31 LBS | HEIGHT: 67 IN | BODY MASS INDEX: 20.45 KG/M2

## 2024-04-22 DIAGNOSIS — R31.29 MICROHEMATURIA: Primary | ICD-10-CM

## 2024-04-22 LAB
BILIRUBIN, UA POC OHS: NEGATIVE
BLOOD, UA POC OHS: ABNORMAL
CLARITY, UA POC OHS: CLEAR
COLOR, UA POC OHS: YELLOW
GLUCOSE, UA POC OHS: NEGATIVE
KETONES, UA POC OHS: NEGATIVE
LEUKOCYTES, UA POC OHS: NEGATIVE
NITRITE, UA POC OHS: NEGATIVE
PH, UA POC OHS: 5.5
PROTEIN, UA POC OHS: NEGATIVE
SPECIFIC GRAVITY, UA POC OHS: 1.01
UROBILINOGEN, UA POC OHS: 0.2

## 2024-04-22 PROCEDURE — 99204 OFFICE O/P NEW MOD 45 MIN: CPT | Mod: S$GLB,,, | Performed by: UROLOGY

## 2024-04-22 PROCEDURE — 81003 URINALYSIS AUTO W/O SCOPE: CPT | Mod: QW,S$GLB,, | Performed by: UROLOGY

## 2024-04-22 PROCEDURE — 99999 PR PBB SHADOW E&M-EST. PATIENT-LVL III: CPT | Mod: PBBFAC,,, | Performed by: UROLOGY

## 2024-04-22 PROCEDURE — 3008F BODY MASS INDEX DOCD: CPT | Mod: CPTII,S$GLB,, | Performed by: UROLOGY

## 2024-04-22 PROCEDURE — 1159F MED LIST DOCD IN RCRD: CPT | Mod: CPTII,S$GLB,, | Performed by: UROLOGY

## 2024-04-22 RX ORDER — BUTALB/ACETAMINOPHEN/CAFFEINE 50-325-40
1 TABLET ORAL DAILY
COMMUNITY

## 2024-04-22 RX ORDER — CHOLECALCIFEROL (VITAMIN D3) 25 MCG
1000 TABLET ORAL DAILY
COMMUNITY

## 2024-04-22 RX ORDER — DIAZEPAM 5 MG/1
5 TABLET ORAL EVERY 6 HOURS PRN
Qty: 1 TABLET | Refills: 0 | Status: SHIPPED | OUTPATIENT
Start: 2024-04-22 | End: 2024-06-05

## 2024-04-22 NOTE — PROGRESS NOTES
Subjective:       Patient ID: Nerissa Felix is a 63 y.o. female.    Chief Complaint: Follow-up    HPI    63-year-old with microscopic hematuria.  She was previously seen in 2015 for the same reason and underwent cystoscopy and kidney ultrasound at that time which was negative.  She denies any history of gross hematuria.  She has no bothersome urinary symptoms.  She denies dysuria frequency and urgency.  She has no history of stones.  She does have right-sided lower back pain which is positional and reproducible.  Urine dipstick shows positive for 2+blood.  Micro exam: 0 WBC's per HPF, 5-10 RBC's per HPF, and 0 bacteria.      Past Medical History:   Diagnosis Date    Chronic left shoulder pain     Ductal carcinoma in situ (DCIS) of left breast 2007    Genetic testing 10/2022    negative, [employer] Breast Cancer Panel, DNA only, 13-genes    H/O pleural effusion     Hashimoto encephalopathy 1999    autoimmune disorder in remission    Heart murmur     Hypothyroidism     Iliotibial band syndrome of right side     Long-term use of aspirin therapy     81 mg asa    Neck pain     PAF (paroxysmal atrial fibrillation)     Seizures 1999    TMJ (dislocation of temporomandibular joint)      Past Surgical History:   Procedure Laterality Date    ABDOMINAL HERNIA REPAIR  2008    ABLATION OF ARRHYTHMOGENIC FOCUS FOR ATRIAL FIBRILLATION N/A 11/26/2018    Procedure: Ablation atrial fibrillation;  Surgeon: Jesus Galvin III, MD;  Location: [facility];  Service: Cardiology;  Laterality: N/A;    BILATERAL MASTECTOMY Bilateral 2007    BREAST RECONSTRUCTION Bilateral 2007    with ÓSCAR flaps with bilat. hip tissue    KNEE ARTHROSCOPY Right 2017    MASS EXCISION Right 12/28/2015    R hip, encapsulated fat necrosis with fibrosis       Current Outpatient Medications:     aspirin (ECOTRIN) 81 MG EC tablet, Take 81 mg by mouth once daily., Disp: , Rfl:     BIOTIN ORAL, Take by mouth., Disp: , Rfl:     calcium citrate-vitamin D3 315-200 mg  (CITRACAL+D) 315 mg-5 mcg (200 unit) per tablet, Take 1 tablet by mouth once daily., Disp: , Rfl:     DIETARY SUPPLEMENT ORAL, Take 1 tablet by mouth once daily. Juice Plus, Disp: , Rfl:     estradioL (VAGIFEM) 10 mcg Tab, Imvexxy Maintenance Pack 10 mcg vaginal insert  Insert 1 vaginal insert twice a week by vaginal route., Disp: , Rfl:     ipratropium (ATROVENT) 42 mcg (0.06 %) nasal spray, 2 sprays by Each Nostril route 3 (three) times daily as needed for Rhinitis., Disp: 45 mL, Rfl: 4    MULTIVITAMIN ORAL, Take by mouth., Disp: , Rfl:     mupirocin (BACTROBAN) 2 % ointment, Apply pea sized amount to inside of nostrils twice daily for 10 days, Disp: 22 g, Rfl: 3    SYNTHROID 100 mcg tablet, Take 100 mcg by mouth before breakfast., Disp: , Rfl:     UNABLE TO FIND, 2 capsules daily as needed. Nutrafol, Disp: , Rfl:     vitamin D (VITAMIN D3) 1000 units Tab, Take 1,000 Units by mouth once daily., Disp: , Rfl:     zinc sulfate (ZINC-15) 66 mg Tab, zinc, Disp: , Rfl:     diazePAM (VALIUM) 5 MG tablet, Take 1 tablet (5 mg total) by mouth every 6 (six) hours as needed for Anxiety., Disp: 1 tablet, Rfl: 0      Review of Systems   Constitutional:  Negative for fever.   Genitourinary:  Negative for dysuria, flank pain and hematuria.   Musculoskeletal:  Positive for back pain.       Objective:      Physical Exam  Vitals reviewed.   Constitutional:       Appearance: She is well-developed.   Pulmonary:      Effort: Pulmonary effort is normal. No respiratory distress.   Abdominal:      Tenderness: There is no right CVA tenderness or left CVA tenderness.   Skin:     General: Skin is warm.   Neurological:      Mental Status: She is alert and oriented to person, place, and time.   Psychiatric:         Behavior: Behavior normal.         Assessment:       1. Microhematuria        Plan:       Microhematuria  -     POCT Urinalysis(Instrument)  -     US Retroperitoneal Complete; Future; Expected date: 04/22/2024  -     Cystoscopy;  Future    Other orders  -     diazePAM (VALIUM) 5 MG tablet; Take 1 tablet (5 mg total) by mouth every 6 (six) hours as needed for Anxiety.  Dispense: 1 tablet; Refill: 0      Follow-up for repeat cystoscopy and kidney ultrasound.  She requests sedation with Valium before the cystoscopy.  She knows to have transportation available after the procedure

## 2024-06-06 ENCOUNTER — HOSPITAL ENCOUNTER (OUTPATIENT)
Dept: RADIOLOGY | Facility: HOSPITAL | Age: 64
Discharge: HOME | End: 2024-06-06
Attending: UROLOGY
Payer: COMMERCIAL

## 2024-06-06 DIAGNOSIS — R31.29 MICROHEMATURIA: ICD-10-CM

## 2024-06-06 PROCEDURE — 76770 US EXAM ABDO BACK WALL COMP: CPT | Mod: TC,PO

## 2024-06-06 PROCEDURE — 76770 US EXAM ABDO BACK WALL COMP: CPT | Mod: 26,,, | Performed by: RADIOLOGY

## 2024-06-10 ENCOUNTER — PATIENT MESSAGE (OUTPATIENT)
Dept: UROLOGY | Facility: CLINIC | Age: 64
End: 2024-06-10
Payer: COMMERCIAL

## 2024-06-13 ENCOUNTER — PROCEDURE VISIT (OUTPATIENT)
Dept: UROLOGY | Facility: CLINIC | Age: 64
End: 2024-06-13
Payer: COMMERCIAL

## 2024-06-13 VITALS — HEIGHT: 67 IN | WEIGHT: 129.63 LBS | BODY MASS INDEX: 20.35 KG/M2

## 2024-06-13 DIAGNOSIS — R31.29 MICROHEMATURIA: ICD-10-CM

## 2024-06-13 LAB
BILIRUBIN, UA POC OHS: NEGATIVE
BLOOD, UA POC OHS: ABNORMAL
CLARITY, UA POC OHS: CLEAR
COLOR, UA POC OHS: YELLOW
GLUCOSE, UA POC OHS: NEGATIVE
KETONES, UA POC OHS: NEGATIVE
LEUKOCYTES, UA POC OHS: NEGATIVE
NITRITE, UA POC OHS: NEGATIVE
PH, UA POC OHS: 7
PROTEIN, UA POC OHS: NEGATIVE
SPECIFIC GRAVITY, UA POC OHS: 1.01
UROBILINOGEN, UA POC OHS: 0.2

## 2024-06-13 PROCEDURE — 52000 CYSTOURETHROSCOPY: CPT | Mod: S$GLB,,, | Performed by: UROLOGY

## 2024-06-13 PROCEDURE — 81003 URINALYSIS AUTO W/O SCOPE: CPT | Mod: QW,S$GLB,, | Performed by: UROLOGY

## 2024-12-13 RX ORDER — MUPIROCIN 20 MG/G
OINTMENT TOPICAL
Qty: 22 G | Refills: 3 | Status: SHIPPED | OUTPATIENT
Start: 2024-12-13

## 2025-01-29 RX ORDER — IPRATROPIUM BROMIDE 42 UG/1
SPRAY, METERED NASAL
Qty: 45 ML | Refills: 4 | Status: SHIPPED | OUTPATIENT
Start: 2025-01-29

## 2025-02-19 ENCOUNTER — OFFICE VISIT (OUTPATIENT)
Dept: OTOLARYNGOLOGY | Facility: CLINIC | Age: 65
End: 2025-02-19
Payer: COMMERCIAL

## 2025-02-19 VITALS — WEIGHT: 136.88 LBS | BODY MASS INDEX: 23.37 KG/M2 | HEIGHT: 64 IN

## 2025-02-19 DIAGNOSIS — J30.0 VASOMOTOR RHINITIS: ICD-10-CM

## 2025-02-19 DIAGNOSIS — J34.89 NASAL VESTIBULITIS: Primary | ICD-10-CM

## 2025-02-19 DIAGNOSIS — R04.0 EPISTAXIS: ICD-10-CM

## 2025-02-19 RX ORDER — SULFAMETHOXAZOLE AND TRIMETHOPRIM 800; 160 MG/1; MG/1
1 TABLET ORAL 2 TIMES DAILY
Qty: 20 TABLET | Refills: 0 | Status: SHIPPED | OUTPATIENT
Start: 2025-02-19 | End: 2025-03-01

## 2025-02-19 NOTE — PROGRESS NOTES
Subjective:       Patient ID: Nerissa Felix is a 64 y.o. female.    Chief Complaint: Epistaxis and Nose Problem (Right side a little swollen and tender )    Nerissa is here for follow-up of nasal vestibulitis and vasomotor rhinitis.  She did develop a brief episode of epistaxis this am - self limiting.   Left nasal sill always mild crusty.    She also has had some sensitivity inside her nasal passage on the right and mild swelling/erythema along the right dome.  She has been using Atrovent with pretty good response.   She is using Mupirocin when it starts to get to irritated. Maybe 4 times or so per month.     Last OV 12/23   I saw her 3 months ago and Rx Mupirocin and Atrovent.    She does get complete relief with Mupirocin cream but then seems to come back about a week after. Present today, though not as bad as before. Uses it for 2-3 weeks at a time.   Did take an oral antibiotic during the one of the courses.     She does find herself rubbing nasal rim with tissue because of drainage.   The Atrovent spray helps with rhinorrhea but maybe could use it more.     Patient validated questionnaires (if applicable):      %            No data to display                   No data to display                   No data to display                     Review of Systems   Constitutional: Negative for activity change and appetite change.   Respiratory: Negative for difficulty breathing and wheezing   Cardiovascular: Negative for chest pain.      Objective:        Constitutional:   Vital signs are normal. She appears well-developed and well-nourished.     Head:  Normocephalic and atraumatic.     Ears:  Hearing normal to normal and whispered voice; external ear normal without scars, lesions, or masses; ear canal, tympanic membrane, and middle ear normal..     Nose:  Nose normal including turbinates, nasal mucosa, sinuses and nasal septum.   Superficial crust a few mm along left nasal sill / floor. Mild rhinorrhea on right. Small  focal vessel along L ant septum. Subtle erythema and swelling 7-8 mm along right dome.     Mouth/Throat  Oropharynx clear and moist without lesions or asymmetry.     Neck:  Neck normal without thyromegaly masses, asymmetry, normal tracheal structure, crepitus, and tenderness.         Tests / Results:  none    Assessment:       1. Nasal vestibulitis    2. Vasomotor rhinitis    3. Epistaxis            Plan:         Possible early nasal folliculitis. Has MRSA history. May be getting better on own but Bactrim sent in care it worsens.   Continue Mupirocin prn.  Discussed nasal moisture for epistaxis. If recurrent, can look at focal cautery   Continue Atrovent

## 2025-03-12 NOTE — PROCEDURES
Cystoscopy    Date/Time: 6/13/2024 2:00 PM    Performed by: JUDY Gillis MD  Authorized by: JUDY Gillis MD    Consent Done?:  Yes (Written)  Timeout: prior to procedure the correct patient, procedure, and site was verified    Prep: patient was prepped and draped in usual sterile fashion    Indications: hematuria    Position:  Other  Patient sedated?: No    Preparation: Patient was prepped and draped in usual sterile fashion    Scope type:  Flexible cystoscope   patient tolerated the procedure well with no immediate complications    Blood Loss:  None    The patients clinic history and physical were reviewed and there are no changes.    The patient was placed in the frog-leg position.  The genitals were prepped and draped in a sterile fashion.   Using a flexible scope, a careful cystoscopic exam was then performed.  The entire bladder mucosa was systematically visualized.  The mucosa appeared normal.  There were no lesions, masses foreign bodies or stones.   Each ureteral orifices were visualized and both had clear efflux of urine.  On retroflexion the bladder neck appeared normal.  The cystoscope was then removed and I examined the entire length of the urethra.  The urethra appeared normal.  She tolerated the procedure well.  There were no complications.    Impression:  Normal cystoscopy.    
denies any drug use/caffeine

## 2025-06-19 DIAGNOSIS — Z78.0 MENOPAUSE: Primary | ICD-10-CM

## 2025-06-19 DIAGNOSIS — Z13.820 OSTEOPOROSIS SCREENING: ICD-10-CM

## 2025-06-19 DIAGNOSIS — M85.88 OSTEOPENIA OF OTHER SITE: ICD-10-CM

## 2025-07-21 ENCOUNTER — HOSPITAL ENCOUNTER (OUTPATIENT)
Dept: RADIOLOGY | Facility: HOSPITAL | Age: 65
Discharge: HOME | End: 2025-07-21
Attending: OBSTETRICS & GYNECOLOGY
Payer: COMMERCIAL

## 2025-07-21 DIAGNOSIS — Z78.0 MENOPAUSE: ICD-10-CM

## 2025-07-21 DIAGNOSIS — Z13.820 OSTEOPOROSIS SCREENING: ICD-10-CM

## 2025-07-21 DIAGNOSIS — M85.88 OSTEOPENIA OF OTHER SITE: ICD-10-CM

## 2025-07-21 PROCEDURE — 77080 DXA BONE DENSITY AXIAL: CPT | Mod: TC,PO

## 2025-07-21 PROCEDURE — 77080 DXA BONE DENSITY AXIAL: CPT | Mod: 26,,, | Performed by: RADIOLOGY
